# Patient Record
Sex: MALE | Race: WHITE | Employment: OTHER | URBAN - METROPOLITAN AREA
[De-identification: names, ages, dates, MRNs, and addresses within clinical notes are randomized per-mention and may not be internally consistent; named-entity substitution may affect disease eponyms.]

---

## 2019-07-02 ENCOUNTER — APPOINTMENT (OUTPATIENT)
Dept: GENERAL RADIOLOGY | Age: 84
DRG: 482 | End: 2019-07-02
Payer: MEDICARE

## 2019-07-02 ENCOUNTER — HOSPITAL ENCOUNTER (INPATIENT)
Age: 84
LOS: 3 days | Discharge: NURSING, REHAB OR LONG-TERM CARE | DRG: 482 | End: 2019-07-05
Attending: EMERGENCY MEDICINE | Admitting: INTERNAL MEDICINE
Payer: MEDICARE

## 2019-07-02 ENCOUNTER — APPOINTMENT (OUTPATIENT)
Dept: CT IMAGING | Age: 84
DRG: 482 | End: 2019-07-02
Payer: MEDICARE

## 2019-07-02 DIAGNOSIS — Z98.890 HISTORY OF SURGERY: ICD-10-CM

## 2019-07-02 DIAGNOSIS — S72.001A CLOSED FRACTURE OF RIGHT HIP, INITIAL ENCOUNTER (HCC): Primary | ICD-10-CM

## 2019-07-02 LAB
ALBUMIN SERPL-MCNC: 3.4 G/DL (ref 3.5–5.2)
ALP BLD-CCNC: 140 U/L (ref 40–129)
ALT SERPL-CCNC: 12 U/L (ref 0–40)
ANION GAP SERPL CALCULATED.3IONS-SCNC: 10 MMOL/L (ref 7–16)
AST SERPL-CCNC: 22 U/L (ref 0–39)
BILIRUB SERPL-MCNC: 0.5 MG/DL (ref 0–1.2)
BUN BLDV-MCNC: 22 MG/DL (ref 8–23)
CALCIUM SERPL-MCNC: 8.6 MG/DL (ref 8.6–10.2)
CHLORIDE BLD-SCNC: 105 MMOL/L (ref 98–107)
CO2: 26 MMOL/L (ref 22–29)
CREAT SERPL-MCNC: 1.7 MG/DL (ref 0.7–1.2)
GFR AFRICAN AMERICAN: 46
GFR NON-AFRICAN AMERICAN: 38 ML/MIN/1.73
GLUCOSE BLD-MCNC: 154 MG/DL (ref 74–99)
HCT VFR BLD CALC: 41.3 % (ref 37–54)
HEMOGLOBIN: 14.1 G/DL (ref 12.5–16.5)
INR BLD: 1.1
MCH RBC QN AUTO: 31.5 PG (ref 26–35)
MCHC RBC AUTO-ENTMCNC: 34.1 % (ref 32–34.5)
MCV RBC AUTO: 92.2 FL (ref 80–99.9)
PDW BLD-RTO: 15.7 FL (ref 11.5–15)
PLATELET # BLD: 311 E9/L (ref 130–450)
PMV BLD AUTO: 10.9 FL (ref 7–12)
POTASSIUM SERPL-SCNC: 4.3 MMOL/L (ref 3.5–5)
PROTHROMBIN TIME: 12.7 SEC (ref 9.3–12.4)
RBC # BLD: 4.48 E12/L (ref 3.8–5.8)
SODIUM BLD-SCNC: 141 MMOL/L (ref 132–146)
TOTAL PROTEIN: 7 G/DL (ref 6.4–8.3)
WBC # BLD: 6.2 E9/L (ref 4.5–11.5)

## 2019-07-02 PROCEDURE — 85610 PROTHROMBIN TIME: CPT

## 2019-07-02 PROCEDURE — 80053 COMPREHEN METABOLIC PANEL: CPT

## 2019-07-02 PROCEDURE — 70450 CT HEAD/BRAIN W/O DYE: CPT

## 2019-07-02 PROCEDURE — 73080 X-RAY EXAM OF ELBOW: CPT

## 2019-07-02 PROCEDURE — 99284 EMERGENCY DEPT VISIT MOD MDM: CPT

## 2019-07-02 PROCEDURE — 85027 COMPLETE CBC AUTOMATED: CPT

## 2019-07-02 PROCEDURE — 73552 X-RAY EXAM OF FEMUR 2/>: CPT

## 2019-07-02 PROCEDURE — 72125 CT NECK SPINE W/O DYE: CPT

## 2019-07-02 PROCEDURE — 73502 X-RAY EXAM HIP UNI 2-3 VIEWS: CPT

## 2019-07-02 PROCEDURE — 36415 COLL VENOUS BLD VENIPUNCTURE: CPT

## 2019-07-02 PROCEDURE — 71045 X-RAY EXAM CHEST 1 VIEW: CPT

## 2019-07-02 PROCEDURE — 1200000000 HC SEMI PRIVATE

## 2019-07-02 PROCEDURE — 93005 ELECTROCARDIOGRAM TRACING: CPT | Performed by: EMERGENCY MEDICINE

## 2019-07-02 PROCEDURE — 99222 1ST HOSP IP/OBS MODERATE 55: CPT | Performed by: ORTHOPAEDIC SURGERY

## 2019-07-02 RX ORDER — SODIUM CHLORIDE 0.9 % (FLUSH) 0.9 %
10 SYRINGE (ML) INJECTION PRN
Status: DISCONTINUED | OUTPATIENT
Start: 2019-07-02 | End: 2019-07-05 | Stop reason: HOSPADM

## 2019-07-02 RX ORDER — SODIUM CHLORIDE 0.9 % (FLUSH) 0.9 %
10 SYRINGE (ML) INJECTION EVERY 12 HOURS SCHEDULED
Status: DISCONTINUED | OUTPATIENT
Start: 2019-07-03 | End: 2019-07-02 | Stop reason: SDUPTHER

## 2019-07-02 RX ORDER — ONDANSETRON 2 MG/ML
4 INJECTION INTRAMUSCULAR; INTRAVENOUS EVERY 6 HOURS PRN
Status: DISCONTINUED | OUTPATIENT
Start: 2019-07-02 | End: 2019-07-02

## 2019-07-02 RX ORDER — SODIUM CHLORIDE 0.9 % (FLUSH) 0.9 %
10 SYRINGE (ML) INJECTION PRN
Status: DISCONTINUED | OUTPATIENT
Start: 2019-07-02 | End: 2019-07-02

## 2019-07-02 RX ORDER — ONDANSETRON 2 MG/ML
4 INJECTION INTRAMUSCULAR; INTRAVENOUS EVERY 6 HOURS PRN
Status: DISCONTINUED | OUTPATIENT
Start: 2019-07-02 | End: 2019-07-05 | Stop reason: HOSPADM

## 2019-07-02 RX ORDER — HYDROCODONE BITARTRATE AND ACETAMINOPHEN 5; 325 MG/1; MG/1
1 TABLET ORAL EVERY 6 HOURS PRN
Status: DISCONTINUED | OUTPATIENT
Start: 2019-07-02 | End: 2019-07-05 | Stop reason: HOSPADM

## 2019-07-02 RX ORDER — SODIUM CHLORIDE 0.9 % (FLUSH) 0.9 %
10 SYRINGE (ML) INJECTION EVERY 12 HOURS SCHEDULED
Status: DISCONTINUED | OUTPATIENT
Start: 2019-07-03 | End: 2019-07-05 | Stop reason: HOSPADM

## 2019-07-02 SDOH — HEALTH STABILITY: MENTAL HEALTH: HOW OFTEN DO YOU HAVE A DRINK CONTAINING ALCOHOL?: NEVER

## 2019-07-02 ASSESSMENT — PAIN DESCRIPTION - LOCATION
LOCATION: LEG
LOCATION: LEG

## 2019-07-02 ASSESSMENT — PAIN DESCRIPTION - PAIN TYPE
TYPE: ACUTE PAIN
TYPE: ACUTE PAIN

## 2019-07-02 ASSESSMENT — PAIN DESCRIPTION - DESCRIPTORS
DESCRIPTORS: DISCOMFORT
DESCRIPTORS: ACHING;DISCOMFORT;NAGGING

## 2019-07-02 ASSESSMENT — PAIN SCALES - GENERAL
PAINLEVEL_OUTOF10: 4
PAINLEVEL_OUTOF10: 3

## 2019-07-03 ENCOUNTER — ANESTHESIA (OUTPATIENT)
Dept: OPERATING ROOM | Age: 84
DRG: 482 | End: 2019-07-03
Payer: MEDICARE

## 2019-07-03 ENCOUNTER — ANESTHESIA EVENT (OUTPATIENT)
Dept: OPERATING ROOM | Age: 84
DRG: 482 | End: 2019-07-03
Payer: MEDICARE

## 2019-07-03 ENCOUNTER — APPOINTMENT (OUTPATIENT)
Dept: GENERAL RADIOLOGY | Age: 84
DRG: 482 | End: 2019-07-03
Payer: MEDICARE

## 2019-07-03 VITALS — OXYGEN SATURATION: 93 % | SYSTOLIC BLOOD PRESSURE: 121 MMHG | DIASTOLIC BLOOD PRESSURE: 69 MMHG

## 2019-07-03 LAB
ABO/RH: NORMAL
ANION GAP SERPL CALCULATED.3IONS-SCNC: 13 MMOL/L (ref 7–16)
ANTIBODY SCREEN: NORMAL
APTT: 31 SEC (ref 24.5–35.1)
BUN BLDV-MCNC: 21 MG/DL (ref 8–23)
CALCIUM SERPL-MCNC: 9 MG/DL (ref 8.6–10.2)
CHLORIDE BLD-SCNC: 103 MMOL/L (ref 98–107)
CO2: 24 MMOL/L (ref 22–29)
CREAT SERPL-MCNC: 1.5 MG/DL (ref 0.7–1.2)
EKG ATRIAL RATE: 69 BPM
EKG P AXIS: 73 DEGREES
EKG P-R INTERVAL: 206 MS
EKG Q-T INTERVAL: 366 MS
EKG QRS DURATION: 94 MS
EKG QTC CALCULATION (BAZETT): 392 MS
EKG R AXIS: 80 DEGREES
EKG T AXIS: 119 DEGREES
EKG VENTRICULAR RATE: 69 BPM
GFR AFRICAN AMERICAN: 53
GFR NON-AFRICAN AMERICAN: 44 ML/MIN/1.73
GLUCOSE BLD-MCNC: 120 MG/DL (ref 74–99)
HCT VFR BLD CALC: 43.2 % (ref 37–54)
HEMOGLOBIN: 14.1 G/DL (ref 12.5–16.5)
MCH RBC QN AUTO: 30.2 PG (ref 26–35)
MCHC RBC AUTO-ENTMCNC: 32.6 % (ref 32–34.5)
MCV RBC AUTO: 92.5 FL (ref 80–99.9)
PDW BLD-RTO: 15.8 FL (ref 11.5–15)
PLATELET # BLD: 305 E9/L (ref 130–450)
PMV BLD AUTO: 10.7 FL (ref 7–12)
POTASSIUM REFLEX MAGNESIUM: 4 MMOL/L (ref 3.5–5)
RBC # BLD: 4.67 E12/L (ref 3.8–5.8)
SODIUM BLD-SCNC: 140 MMOL/L (ref 132–146)
WBC # BLD: 7.8 E9/L (ref 4.5–11.5)

## 2019-07-03 PROCEDURE — C1713 ANCHOR/SCREW BN/BN,TIS/BN: HCPCS | Performed by: ORTHOPAEDIC SURGERY

## 2019-07-03 PROCEDURE — 3600000005 HC SURGERY LEVEL 5 BASE: Performed by: ORTHOPAEDIC SURGERY

## 2019-07-03 PROCEDURE — 85730 THROMBOPLASTIN TIME PARTIAL: CPT

## 2019-07-03 PROCEDURE — 86900 BLOOD TYPING SEROLOGIC ABO: CPT

## 2019-07-03 PROCEDURE — 72170 X-RAY EXAM OF PELVIS: CPT

## 2019-07-03 PROCEDURE — 2580000003 HC RX 258: Performed by: STUDENT IN AN ORGANIZED HEALTH CARE EDUCATION/TRAINING PROGRAM

## 2019-07-03 PROCEDURE — 36415 COLL VENOUS BLD VENIPUNCTURE: CPT

## 2019-07-03 PROCEDURE — 73502 X-RAY EXAM HIP UNI 2-3 VIEWS: CPT

## 2019-07-03 PROCEDURE — 1200000000 HC SEMI PRIVATE

## 2019-07-03 PROCEDURE — 2580000003 HC RX 258: Performed by: INTERNAL MEDICINE

## 2019-07-03 PROCEDURE — 93010 ELECTROCARDIOGRAM REPORT: CPT | Performed by: INTERNAL MEDICINE

## 2019-07-03 PROCEDURE — 27269 OPTX THIGH FX: CPT | Performed by: ORTHOPAEDIC SURGERY

## 2019-07-03 PROCEDURE — 3600000015 HC SURGERY LEVEL 5 ADDTL 15MIN: Performed by: ORTHOPAEDIC SURGERY

## 2019-07-03 PROCEDURE — 2500000003 HC RX 250 WO HCPCS: Performed by: STUDENT IN AN ORGANIZED HEALTH CARE EDUCATION/TRAINING PROGRAM

## 2019-07-03 PROCEDURE — 80048 BASIC METABOLIC PNL TOTAL CA: CPT

## 2019-07-03 PROCEDURE — 73562 X-RAY EXAM OF KNEE 3: CPT

## 2019-07-03 PROCEDURE — 6370000000 HC RX 637 (ALT 250 FOR IP): Performed by: INTERNAL MEDICINE

## 2019-07-03 PROCEDURE — 7100000000 HC PACU RECOVERY - FIRST 15 MIN: Performed by: ORTHOPAEDIC SURGERY

## 2019-07-03 PROCEDURE — 3700000000 HC ANESTHESIA ATTENDED CARE: Performed by: ORTHOPAEDIC SURGERY

## 2019-07-03 PROCEDURE — 85027 COMPLETE CBC AUTOMATED: CPT

## 2019-07-03 PROCEDURE — 2580000003 HC RX 258

## 2019-07-03 PROCEDURE — 86901 BLOOD TYPING SEROLOGIC RH(D): CPT

## 2019-07-03 PROCEDURE — 6360000002 HC RX W HCPCS

## 2019-07-03 PROCEDURE — 86850 RBC ANTIBODY SCREEN: CPT

## 2019-07-03 PROCEDURE — 2709999900 HC NON-CHARGEABLE SUPPLY: Performed by: ORTHOPAEDIC SURGERY

## 2019-07-03 PROCEDURE — 3209999900 FLUORO FOR SURGICAL PROCEDURES

## 2019-07-03 PROCEDURE — 6360000002 HC RX W HCPCS: Performed by: STUDENT IN AN ORGANIZED HEALTH CARE EDUCATION/TRAINING PROGRAM

## 2019-07-03 PROCEDURE — 2720000010 HC SURG SUPPLY STERILE: Performed by: ORTHOPAEDIC SURGERY

## 2019-07-03 PROCEDURE — 3700000001 HC ADD 15 MINUTES (ANESTHESIA): Performed by: ORTHOPAEDIC SURGERY

## 2019-07-03 PROCEDURE — 2500000003 HC RX 250 WO HCPCS

## 2019-07-03 PROCEDURE — 7100000001 HC PACU RECOVERY - ADDTL 15 MIN: Performed by: ORTHOPAEDIC SURGERY

## 2019-07-03 PROCEDURE — C1769 GUIDE WIRE: HCPCS | Performed by: ORTHOPAEDIC SURGERY

## 2019-07-03 PROCEDURE — 6370000000 HC RX 637 (ALT 250 FOR IP): Performed by: STUDENT IN AN ORGANIZED HEALTH CARE EDUCATION/TRAINING PROGRAM

## 2019-07-03 DEVICE — BOLT BONE L90MM DIA10MM GLD TI ALLOY FOR FEM NK SYS: Type: IMPLANTABLE DEVICE | Site: FEMUR | Status: FUNCTIONAL

## 2019-07-03 DEVICE — PLATE BONE 2 H 130DEG CCD ANG GLD TI ALLOY FOR FEM NK SYS: Type: IMPLANTABLE DEVICE | Site: FEMUR | Status: FUNCTIONAL

## 2019-07-03 DEVICE — SCREW BONE L40MM DIA5MM ST LCK W/ T25 STARDRV: Type: IMPLANTABLE DEVICE | Site: FEMUR | Status: FUNCTIONAL

## 2019-07-03 DEVICE — SCREW BONE L90MM DIA6.4MM BLU TI ALLOY ANTIROTATION T25: Type: IMPLANTABLE DEVICE | Site: FEMUR | Status: FUNCTIONAL

## 2019-07-03 RX ORDER — ASPIRIN 81 MG/1
81 TABLET ORAL 2 TIMES DAILY
Status: DISCONTINUED | OUTPATIENT
Start: 2019-07-03 | End: 2019-07-05 | Stop reason: HOSPADM

## 2019-07-03 RX ORDER — PHENYLEPHRINE HYDROCHLORIDE 10 MG/ML
INJECTION INTRAVENOUS PRN
Status: DISCONTINUED | OUTPATIENT
Start: 2019-07-03 | End: 2019-07-03 | Stop reason: SDUPTHER

## 2019-07-03 RX ORDER — PROPOFOL 10 MG/ML
INJECTION, EMULSION INTRAVENOUS PRN
Status: DISCONTINUED | OUTPATIENT
Start: 2019-07-03 | End: 2019-07-03 | Stop reason: SDUPTHER

## 2019-07-03 RX ORDER — ASPIRIN 81 MG/1
81 TABLET ORAL 2 TIMES DAILY
Qty: 56 TABLET | Refills: 0 | Status: ON HOLD | OUTPATIENT
Start: 2019-07-03 | End: 2019-07-27 | Stop reason: HOSPADM

## 2019-07-03 RX ORDER — SODIUM CHLORIDE 9 MG/ML
INJECTION, SOLUTION INTRAVENOUS CONTINUOUS
Status: DISCONTINUED | OUTPATIENT
Start: 2019-07-03 | End: 2019-07-04

## 2019-07-03 RX ORDER — EPHEDRINE SULFATE/0.9% NACL/PF 50 MG/5 ML
SYRINGE (ML) INTRAVENOUS PRN
Status: DISCONTINUED | OUTPATIENT
Start: 2019-07-03 | End: 2019-07-03 | Stop reason: SDUPTHER

## 2019-07-03 RX ORDER — SODIUM CHLORIDE 9 MG/ML
INJECTION, SOLUTION INTRAVENOUS CONTINUOUS PRN
Status: DISCONTINUED | OUTPATIENT
Start: 2019-07-03 | End: 2019-07-03 | Stop reason: SDUPTHER

## 2019-07-03 RX ORDER — BUPIVACAINE HYDROCHLORIDE 7.5 MG/ML
INJECTION, SOLUTION INTRASPINAL PRN
Status: DISCONTINUED | OUTPATIENT
Start: 2019-07-03 | End: 2019-07-03 | Stop reason: SDUPTHER

## 2019-07-03 RX ORDER — FENTANYL CITRATE 50 UG/ML
INJECTION, SOLUTION INTRAMUSCULAR; INTRAVENOUS PRN
Status: DISCONTINUED | OUTPATIENT
Start: 2019-07-03 | End: 2019-07-03 | Stop reason: SDUPTHER

## 2019-07-03 RX ORDER — HYDROCODONE BITARTRATE AND ACETAMINOPHEN 5; 325 MG/1; MG/1
1 TABLET ORAL EVERY 6 HOURS PRN
Qty: 28 TABLET | Refills: 0 | Status: SHIPPED | OUTPATIENT
Start: 2019-07-03 | End: 2019-07-10

## 2019-07-03 RX ADMIN — PHENYLEPHRINE HYDROCHLORIDE 100 MCG: 10 INJECTION INTRAVENOUS at 09:05

## 2019-07-03 RX ADMIN — PHENYLEPHRINE HYDROCHLORIDE 100 MCG: 10 INJECTION INTRAVENOUS at 08:45

## 2019-07-03 RX ADMIN — TRANEXAMIC ACID 1000 MG: 1 INJECTION, SOLUTION INTRAVENOUS at 09:15

## 2019-07-03 RX ADMIN — Medication 2 G: at 08:38

## 2019-07-03 RX ADMIN — PHENYLEPHRINE HYDROCHLORIDE 100 MCG: 10 INJECTION INTRAVENOUS at 08:55

## 2019-07-03 RX ADMIN — HYDROCODONE BITARTRATE AND ACETAMINOPHEN 1 TABLET: 5; 325 TABLET ORAL at 00:18

## 2019-07-03 RX ADMIN — Medication 5 MG: at 09:05

## 2019-07-03 RX ADMIN — HYDROCODONE BITARTRATE AND ACETAMINOPHEN 1 TABLET: 5; 325 TABLET ORAL at 23:53

## 2019-07-03 RX ADMIN — BUPIVACAINE HYDROCHLORIDE IN DEXTROSE 1.5 ML: 7.5 INJECTION, SOLUTION SUBARACHNOID at 08:37

## 2019-07-03 RX ADMIN — PROPOFOL 30 MG: 10 INJECTION, EMULSION INTRAVENOUS at 08:28

## 2019-07-03 RX ADMIN — HYDROCODONE BITARTRATE AND ACETAMINOPHEN 1 TABLET: 5; 325 TABLET ORAL at 17:47

## 2019-07-03 RX ADMIN — PHENYLEPHRINE HYDROCHLORIDE 100 MCG: 10 INJECTION INTRAVENOUS at 09:00

## 2019-07-03 RX ADMIN — SODIUM CHLORIDE: 9 INJECTION, SOLUTION INTRAVENOUS at 17:47

## 2019-07-03 RX ADMIN — Medication 5 MG: at 09:18

## 2019-07-03 RX ADMIN — PHENYLEPHRINE HYDROCHLORIDE 100 MCG: 10 INJECTION INTRAVENOUS at 08:37

## 2019-07-03 RX ADMIN — PROPOFOL 20 MG: 10 INJECTION, EMULSION INTRAVENOUS at 08:36

## 2019-07-03 RX ADMIN — TRANEXAMIC ACID 1000 MG: 1 INJECTION, SOLUTION INTRAVENOUS at 12:53

## 2019-07-03 RX ADMIN — FENTANYL CITRATE 20 MCG: 50 INJECTION, SOLUTION INTRAMUSCULAR; INTRAVENOUS at 08:37

## 2019-07-03 RX ADMIN — SODIUM CHLORIDE: 9 INJECTION, SOLUTION INTRAVENOUS at 00:50

## 2019-07-03 RX ADMIN — HYDROCODONE BITARTRATE AND ACETAMINOPHEN 1 TABLET: 5; 325 TABLET ORAL at 06:39

## 2019-07-03 RX ADMIN — SODIUM CHLORIDE: 9 INJECTION, SOLUTION INTRAVENOUS at 08:25

## 2019-07-03 RX ADMIN — FENTANYL CITRATE 30 MCG: 50 INJECTION, SOLUTION INTRAMUSCULAR; INTRAVENOUS at 08:28

## 2019-07-03 RX ADMIN — SODIUM CHLORIDE: 9 INJECTION, SOLUTION INTRAVENOUS at 12:23

## 2019-07-03 ASSESSMENT — PAIN SCALES - GENERAL
PAINLEVEL_OUTOF10: 0
PAINLEVEL_OUTOF10: 5
PAINLEVEL_OUTOF10: 0
PAINLEVEL_OUTOF10: 9
PAINLEVEL_OUTOF10: 0
PAINLEVEL_OUTOF10: 5
PAINLEVEL_OUTOF10: 10
PAINLEVEL_OUTOF10: 5
PAINLEVEL_OUTOF10: 5

## 2019-07-03 ASSESSMENT — PULMONARY FUNCTION TESTS
PIF_VALUE: 0
PIF_VALUE: 1
PIF_VALUE: 0

## 2019-07-03 ASSESSMENT — PAIN DESCRIPTION - LOCATION
LOCATION: LEG
LOCATION: LEG

## 2019-07-03 ASSESSMENT — PAIN DESCRIPTION - PAIN TYPE
TYPE: SURGICAL PAIN
TYPE: SURGICAL PAIN

## 2019-07-03 ASSESSMENT — PAIN DESCRIPTION - DESCRIPTORS
DESCRIPTORS: ACHING;DISCOMFORT
DESCRIPTORS: ACHING;DISCOMFORT

## 2019-07-03 NOTE — ANESTHESIA PROCEDURE NOTES
Spinal Block    Patient location during procedure: OR  End time: 7/3/2019 8:38 AM  Reason for block: primary anesthetic  Staffing  Anesthesiologist: Winnie Lott MD  Performed: anesthesiologist   Preanesthetic Checklist  Completed: patient identified, site marked, surgical consent, pre-op evaluation, timeout performed, IV checked, risks and benefits discussed, monitors and equipment checked, anesthesia consent given, oxygen available and patient being monitored  Spinal Block  Patient position: right lateral decubitus  Prep: ChloraPrep  Patient monitoring: cardiac monitor, continuous pulse ox, continuous capnometry and frequent blood pressure checks  Approach: right paramedian  Location: L3/L4  Provider prep: mask and sterile gloves  Local infiltration: lidocaine  Dose: 0.2  Agent: bupivacaine  Adjuvant: fentanyl  Dose: 1.5  Dose: 1.5  Needle  Needle type: Quincke   Assessment  Swirl obtained: Yes  CSF: clear  Attempts: 2  Hemodynamics: stable  Additional Notes  Well tolerated.  Time out performed

## 2019-07-03 NOTE — ED NOTES
Bed: 19  Expected date:   Expected time:   Means of arrival:   Comments:  Wilmer Snyder RN  07/02/19 2057

## 2019-07-03 NOTE — PROGRESS NOTES
Department of Orthopedic Surgery  Resident Progress Note    Patient seen and examined. Pain controlled. No new complaints. States he is having some right hip pain. No other complaints. VITALS:  BP (!) 153/73   Pulse 69   Temp 97.1 °F (36.2 °C) (Oral)   Resp 18   Ht 5' 8\" (1.727 m)   Wt 145 lb (65.8 kg)   SpO2 93%   BMI 22.05 kg/m²     General: alert and oriented to person, place and time, well-developed and well-nourished, in no acute distress    MUSCULOSKELETAL:   right lower extremity:  · Dressing C/D/I  · Compartments soft and compressible  · +PF/DF/EHL  · +2/4 DP & PT pulses, Brisk Cap refill, Toes warm and perfused  · Distal sensation grossly intact to Peroneals, Sural, Saphenous, and tibial nrs    CBC:   Lab Results   Component Value Date    WBC 6.2 07/02/2019    HGB 14.1 07/02/2019    HCT 41.3 07/02/2019     07/02/2019     PT/INR:    Lab Results   Component Value Date    PROTIME 12.7 07/02/2019    INR 1.1 07/02/2019       ASSESSMENT  · Right basicervial femoral neck fracture    PLAN      · Continue physical therapy and protocol: NWB - RLE  · 24 hour abx coverage  · Deep venous thrombosis prophylaxis - Hold anticoagulants  · PT/OT  · Pain Control: IV and PO  · Monitor H&H  · Plan for surgical fixation Today  · Discussed with Dr. Kathrine Roy/ Dr. Rosendo Sevilla    The procedure, indications, risks, limitations and recovery time were all reviewed with patient, who requests to proceed.

## 2019-07-03 NOTE — CARE COORDINATION
Spoke with daughter at bedside to discuss transition of care. Pt in surgery. Daughter stated he was living with her, independently. They recently moved here from [facility]. He was walking the dogs daily. She is concerned he may not be able to come home due to her traveling for work. Discussed NANCY and she is agreeable. Choiced for [facility]. Referral initiated. Awaiting return and post op therapy evals.  CM will follow up

## 2019-07-03 NOTE — H&P
nondisplaced intertrochanteric fracture of the proximal   right femur. The distal femur is unremarkable for acute findings. Degree of osteopenia is present. No trauma involving the knee is   identified. No focal soft tissue abnormalities are identified. IMPRESSION:   Nondisplaced intertrochanteric fracture of the right femur. RIGHT ELBOW:      NUMBER OF IMAGES:  4 views      INDICATION: Right hip pain following injury       COMPARISON: None      FINDINGS:    Bony structures are intact with preservation of alignment and joint   spacing. No focal soft tissue abnormalities are identified. Antecubital   superficial venous access is identified. IMPRESSION:   Negative for acute pathologic findings. XR FEMUR RIGHT (MIN 2 VIEWS)   Final Result   Nondisplaced intertrochanteric fracture of the proximal right femur      RIGHT HIP:      NUMBER OF IMAGES:  4 views      INDICATION: Right hip pain following injury       COMPARISON: None      FINDINGS:    There is a nondisplaced intertrochanteric fracture of the proximal   right femur. The distal femur is unremarkable for acute findings. Degree of osteopenia is present. No trauma involving the knee is   identified. No focal soft tissue abnormalities are identified. IMPRESSION:   Nondisplaced intertrochanteric fracture of the right femur. RIGHT ELBOW:      NUMBER OF IMAGES:  4 views      INDICATION: Right hip pain following injury       COMPARISON: None      FINDINGS:    Bony structures are intact with preservation of alignment and joint   spacing. No focal soft tissue abnormalities are identified. Antecubital   superficial venous access is identified. IMPRESSION:   Negative for acute pathologic findings.             XR ELBOW RIGHT (MIN 3 VIEWS)   Final Result   Nondisplaced intertrochanteric fracture of the proximal right femur      RIGHT HIP:      NUMBER OF IMAGES:  4 views      INDICATION: Right hip pain

## 2019-07-03 NOTE — ED PROVIDER NOTES
distress  Cardiovascular:  Regular rate. Regular rhythm. No murmurs, gallops, or rubs. 2+ distal pulses  Chest: No chest wall tenderness  Abdomen: Soft. Non tender. Non distended. +BS. No rebound, guarding, or rigidity. No pulsatile masses appreciated. Musculoskeletal: Contusion and abrasion noted to right elbow. Tenderness noted to distal right femur region and has limited ROM of the RLE secondary to pain. Warm and well perfused, no clubbing, cyanosis, or edema. Capillary refill <3 seconds  Skin: warm  Neurologic: GCS 15, CN II-XII grossly intact, no focal deficits,  Psych: Normal Affect    -------------------------------------------------- RESULTS -------------------------------------------------  I have personally reviewed all laboratory and imaging results for this patient. Results are listed below.      LABS:  Results for orders placed or performed during the hospital encounter of 07/02/19   CBC   Result Value Ref Range    WBC 6.2 4.5 - 11.5 E9/L    RBC 4.48 3.80 - 5.80 E12/L    Hemoglobin 14.1 12.5 - 16.5 g/dL    Hematocrit 41.3 37.0 - 54.0 %    MCV 92.2 80.0 - 99.9 fL    MCH 31.5 26.0 - 35.0 pg    MCHC 34.1 32.0 - 34.5 %    RDW 15.7 (H) 11.5 - 15.0 fL    Platelets 234 398 - 967 E9/L    MPV 10.9 7.0 - 12.0 fL   Comprehensive Metabolic Panel   Result Value Ref Range    Sodium 141 132 - 146 mmol/L    Potassium 4.3 3.5 - 5.0 mmol/L    Chloride 105 98 - 107 mmol/L    CO2 26 22 - 29 mmol/L    Anion Gap 10 7 - 16 mmol/L    Glucose 154 (H) 74 - 99 mg/dL    BUN 22 8 - 23 mg/dL    CREATININE 1.7 (H) 0.7 - 1.2 mg/dL    GFR Non-African American 38 >=60 mL/min/1.73    GFR African American 46     Calcium 8.6 8.6 - 10.2 mg/dL    Total Protein 7.0 6.4 - 8.3 g/dL    Alb 3.4 (L) 3.5 - 5.2 g/dL    Total Bilirubin 0.5 0.0 - 1.2 mg/dL    Alkaline Phosphatase 140 (H) 40 - 129 U/L    ALT 12 0 - 40 U/L    AST 22 0 - 39 U/L   Protime-INR   Result Value Ref Range    Protime 12.7 (H) 9.3 - 12.4 sec    INR 1.1    EKG 12 Lead   Result knee is   identified. No focal soft tissue abnormalities are identified. IMPRESSION:   Nondisplaced intertrochanteric fracture of the right femur. RIGHT ELBOW:      NUMBER OF IMAGES:  4 views      INDICATION: Right hip pain following injury       COMPARISON: None      FINDINGS:    Bony structures are intact with preservation of alignment and joint   spacing. No focal soft tissue abnormalities are identified. Antecubital   superficial venous access is identified. IMPRESSION:   Negative for acute pathologic findings. XR ELBOW RIGHT (MIN 3 VIEWS)   Final Result   Nondisplaced intertrochanteric fracture of the proximal right femur      RIGHT HIP:      NUMBER OF IMAGES:  4 views      INDICATION: Right hip pain following injury       COMPARISON: None      FINDINGS:    There is a nondisplaced intertrochanteric fracture of the proximal   right femur. The distal femur is unremarkable for acute findings. Degree of osteopenia is present. No trauma involving the knee is   identified. No focal soft tissue abnormalities are identified. IMPRESSION:   Nondisplaced intertrochanteric fracture of the right femur. RIGHT ELBOW:      NUMBER OF IMAGES:  4 views      INDICATION: Right hip pain following injury       COMPARISON: None      FINDINGS:    Bony structures are intact with preservation of alignment and joint   spacing. No focal soft tissue abnormalities are identified. Antecubital   superficial venous access is identified. IMPRESSION:   Negative for acute pathologic findings. EKG:  This EKG is signed and interpreted by me. Rate: 69  Rhythm: Sinus  Interpretation: non-specific EKG  Comparison: no previous EKG available      ------------------------- NURSING NOTES AND VITALS REVIEWED ---------------------------   The nursing notes within the ED encounter and vital signs as below have been reviewed by myself.   /72   Pulse 72   Temp 97.4 °F (36.3 °C)   Resp 18   Ht 5' 8\" (1.727 m)   Wt 145 lb (65.8 kg)   SpO2 96%   BMI 22.05 kg/m²   Oxygen Saturation Interpretation: Normal    The patients available past medical records and past encounters were reviewed. ------------------------------ ED COURSE/MEDICAL DECISION MAKING----------------------  Medications - No data to display    Medical Decision Making:    Patient assessed. Imaging and Labs ordered and will be reviewed; results to be discussed with patient. I did speak to family and family reports ED did strike his head and he was not aware of this. CT head was ordered as well as CT of his neck since he did hit his head and patient has distracting injury. This patient's ED course included: a personal history and physical examination and re-evaluation prior to disposition. This patient has been closely monitored during their ED course. Re-Evaluations:           Re-evaluation. Patient symptoms show no change. Pt made aware of findings. Consultations:             Internal medicine and orthopedics consulted  Counseling: The emergency provider has spoken with the patient and discussed todays results, in addition to providing specific details for the plan of care and counseling regarding the diagnosis and prognosis. Questions are answered at this time and they are agreeable with the plan.     --------------------------------- IMPRESSION AND DISPOSITION ---------------------------------    IMPRESSION  1. [facility] fracture of right [facility]        DISPOSITION  Disposition: Admit to med/surg floor  Patient condition is stable     7/2/19, 9:02 PM.    This note is prepared by [employer], acting as Scribe for Josefa Cantor MD.    Josefa Cantor MD: The scribe's documentation has been prepared under my direction and personally reviewed by me in its entirety.   I confirm that the note above accurately reflects all work, treatment, procedures, and medical decision

## 2019-07-03 NOTE — CONSULTS
agitation and anxiety    PHYSICAL EXAM:    VITALS:  BP (!) 153/73   Pulse 69   Temp 97.1 °F (36.2 °C) (Oral)   Resp 18   Ht 5' 8\" (1.727 m)   Wt 145 lb (65.8 kg)   SpO2 93%   BMI 22.05 kg/m²   CONSTITUTIONAL:  awake, alert, cooperative, no apparent distress, and appears stated age  MUSCULOSKELETAL:  Right lower Extremity:  · Positive tenderness to palpation about the hip  · Skin intact  · Compartments soft and compressible  · Positive logroll  · No pain at the knee tibia, ankle, or foot  · Positive PF/DF/EHL  · Sensation intact light touch DP/SP/T/S/S nerve distributions  · PT/DP pulses 2+    Secondary Exam:   · bilateralUE: Skin tear to left elbow. -TTP to fingers, hand, wrist, forearm, elbow, humerus, shoulder or clavicle. -- Patient able to flex/extend fingers, wrist, elbow and shoulder with active and passive ROM without pain, +2/4 Radial pulse, cap refill <3sec, +AIN/PIN/Radial/Ulnar/Median N, distal sensation grossly intact to C4-T1 dermatomes, compartments soft and compressible. · leftLE: No obvious signs of trauma. -TTP to foot, ankle, leg, knee, thigh, hip.-- Patient able to flex/extend toes, ankle, knee and hip with active and passive ROM without pain,+2/4 DP & PT pulses, cap refill <3sec, +5/5 PF/DF/EHL, distal sensation grossly intact to L4-S1 dermatomes, compartments soft and compressible. · Pelvis: -TTP, -Log roll, -Heel strike     DATA:    CBC:   Lab Results   Component Value Date    WBC 6.2 07/02/2019    RBC 4.48 07/02/2019    HGB 14.1 07/02/2019    HCT 41.3 07/02/2019    MCV 92.2 07/02/2019    MCH 31.5 07/02/2019    MCHC 34.1 07/02/2019    RDW 15.7 07/02/2019     07/02/2019    MPV 10.9 07/02/2019     PT/INR:    Lab Results   Component Value Date    PROTIME 12.7 07/02/2019    INR 1.1 07/02/2019       Radiology Review:  XR hip right:  Demonstrates a basicervical femoral neck fracture with minimal displacement on AP view, slight apex anterior lateral view.     IMPRESSION:  · Closed

## 2019-07-03 NOTE — ANESTHESIA PRE PROCEDURE
Department of Anesthesiology  Preprocedure Note       Name:  Virgil Ordoñez   Age:  80 y.o.  :  1928                                          MRN:  [id number]         Date:  7/3/2019      Surgeon: Sandy Cartwright):  Kianna Allan MD    Procedure: HIP OPEN REDUCTION INTERNAL FIXATION (Right )    Medications prior to admission:   Prior to Admission medications    Not on File       Current medications:    Current Facility-Administered Medications   Medication Dose Route Frequency Provider Last Rate Last Dose    0.9 % sodium chloride infusion   Intravenous Continuous Sis Mathews MD 75 mL/hr at 19 0050      sodium chloride flush 0.9 % injection 10 mL  10 mL Intravenous 2 times per day Sis Mathews MD        sodium chloride flush 0.9 % injection 10 mL  10 mL Intravenous PRN Sis Mathews MD        magnesium hydroxide (MILK OF MAGNESIA) 400 MG/5ML suspension 30 mL  30 mL Oral Daily PRN Sis Mathews MD        ondansetron (ZOFRAN) injection 4 mg  4 mg Intravenous Q6H PRN Sis Mathews MD        HYDROcodone-acetaminophen (NORCO) 5-325 MG per tablet 1 tablet  1 tablet Oral Q6H PRN Sis Mathews MD   1 tablet at 19 0018       Allergies:  No Known Allergies    Problem List:    Patient Active Problem List   Diagnosis Code    [facility] S72.001A       Past Medical History:  History reviewed. No pertinent past medical history. Past Surgical History:  History reviewed. No pertinent surgical history.     Social History:    Social History     Tobacco Use    Smoking status: Never Smoker    Smokeless tobacco: Never Used   Substance Use Topics    Alcohol use: Never     Frequency: Never                                Counseling given: Not Answered      Vital Signs (Current):   Vitals:    19 2100 19 2229 19 2301   BP: 129/72 121/65 (!) 153/73   Pulse: 72 67 69   Resp: 18 18 18   Temp: 36.3 °C (97.4 °F) 36.7 °C (98.1 °F) 36.2 °C (97.1 °F)   TempSrc: Oral Oral   SpO2: 96% 98% 93%   Weight: 145 lb (65.8 kg)     Height: 5' 8\" (1.727 m)                                                BP Readings from Last 3 Encounters:   07/02/19 (!) 153/73       NPO Status: Time of last liquid consumption: 2300                        Time of last solid consumption: 2300                        Date of last liquid consumption: 07/02/19                        Date of last solid food consumption: 07/02/19    BMI:   Wt Readings from Last 3 Encounters:   07/02/19 145 lb (65.8 kg)     Body mass index is 22.05 kg/m². CBC:   Lab Results   Component Value Date    WBC 6.2 07/02/2019    RBC 4.48 07/02/2019    HGB 14.1 07/02/2019    HCT 41.3 07/02/2019    MCV 92.2 07/02/2019    RDW 15.7 07/02/2019     07/02/2019       CMP:   Lab Results   Component Value Date     07/02/2019    K 4.3 07/02/2019     07/02/2019    CO2 26 07/02/2019    BUN 22 07/02/2019    CREATININE 1.7 07/02/2019    GFRAA 46 07/02/2019    LABGLOM 38 07/02/2019    GLUCOSE 154 07/02/2019    PROT 7.0 07/02/2019    CALCIUM 8.6 07/02/2019    BILITOT 0.5 07/02/2019    ALKPHOS 140 07/02/2019    AST 22 07/02/2019    ALT 12 07/02/2019       POC Tests: No results for input(s): POCGLU, POCNA, POCK, POCCL, POCBUN, POCHEMO, POCHCT in the last 72 hours. Coags:   Lab Results   Component Value Date    PROTIME 12.7 07/02/2019    INR 1.1 07/02/2019    APTT 31.0 07/03/2019       HCG (If Applicable): No results found for: PREGTESTUR, PREGSERUM, HCG, HCGQUANT     ABGs: No results found for: PHART, PO2ART, TFP3LAD, EIV7SRN, BEART, T8PPKHJE     Type & Screen (If Applicable):  No results found for: LABABO, LABRH     EKG 7/2/2019  Narrative   Normal sinus rhythm- rate 69  Nonspecific ST and T wave abnormality  Abnormal ECG  No previous ECGs available     XR Right hip 7/2/2019  FINDINGS:    Bony structures are intact with preservation of alignment and joint   spacing.       No focal soft tissue abnormalities are identified. Antecubital   superficial venous access is identified.       IMPRESSION:   Negative for acute pathologic findings.             Anesthesia Evaluation  Patient summary reviewed and Nursing notes reviewed no history of anesthetic complications:   Airway: Mallampati: II  TM distance: >3 FB   Neck ROM: full  Mouth opening: > = 3 FB Dental:      Comment: Patient denies any chipped, loose or cracked teeth    Pulmonary:Negative Pulmonary ROS and normal exam                               Cardiovascular:    (+) CAD (s/p PCI):, CABG/stent (per patient had stent in the past):,       ECG reviewed  Rhythm: regular  Rate: normal           Beta Blocker:  Not on Beta Blocker         Neuro/Psych:   Negative Neuro/Psych ROS              GI/Hepatic/Renal:            ROS comment: Hx bladder Ca. Endo/Other:                      ROS comment: Closed right hip fracture- right  Patient currently DNR CCA Abdominal:           Vascular: negative vascular ROS. Anesthesia Plan      general     ASA 3     (20 R AC- NS at 75cc/hr)  Induction: intravenous. BIS  MIPS: Prophylactic antiemetics administered and Postoperative trial extubation. Anesthetic plan and risks discussed with patient. Use of blood products discussed with patient whom consented to blood products. Plan discussed with CRNA and attending. Denise Aleman RN   7/3/2019      DOS STAFF ADDENDUM:    Patient seen and chart reviewed. Physical exam and history updated as indicated. NPO status confirmed. Anesthesia options and plan discussed including risks benefits with patient/legal guardian and family as available. Concerns and questions addressed. Consent verbalized to proceed. Anesthesia plan, options and intraoperative/postoperative concerns discussed with care team.    Discussed spinal with patient who agrees to both spinal and GA. Patient denies being on blood thinners for 'years'. Advanced directives noted.   Will

## 2019-07-04 LAB
ANION GAP SERPL CALCULATED.3IONS-SCNC: 12 MMOL/L (ref 7–16)
BASOPHILS ABSOLUTE: 0.02 E9/L (ref 0–0.2)
BASOPHILS RELATIVE PERCENT: 0.2 % (ref 0–2)
BUN BLDV-MCNC: 21 MG/DL (ref 8–23)
CALCIUM SERPL-MCNC: 8.3 MG/DL (ref 8.6–10.2)
CHLORIDE BLD-SCNC: 101 MMOL/L (ref 98–107)
CO2: 23 MMOL/L (ref 22–29)
CREAT SERPL-MCNC: 1.4 MG/DL (ref 0.7–1.2)
EOSINOPHILS ABSOLUTE: 0.04 E9/L (ref 0.05–0.5)
EOSINOPHILS RELATIVE PERCENT: 0.5 % (ref 0–6)
GFR AFRICAN AMERICAN: 57
GFR NON-AFRICAN AMERICAN: 47 ML/MIN/1.73
GLUCOSE BLD-MCNC: 128 MG/DL (ref 74–99)
HCT VFR BLD CALC: 39.6 % (ref 37–54)
HEMOGLOBIN: 12.8 G/DL (ref 12.5–16.5)
IMMATURE GRANULOCYTES #: 0.04 E9/L
IMMATURE GRANULOCYTES %: 0.5 % (ref 0–5)
LYMPHOCYTES ABSOLUTE: 1.17 E9/L (ref 1.5–4)
LYMPHOCYTES RELATIVE PERCENT: 13.7 % (ref 20–42)
MCH RBC QN AUTO: 30.1 PG (ref 26–35)
MCHC RBC AUTO-ENTMCNC: 32.3 % (ref 32–34.5)
MCV RBC AUTO: 93.2 FL (ref 80–99.9)
MONOCYTES ABSOLUTE: 1.34 E9/L (ref 0.1–0.95)
MONOCYTES RELATIVE PERCENT: 15.7 % (ref 2–12)
NEUTROPHILS ABSOLUTE: 5.9 E9/L (ref 1.8–7.3)
NEUTROPHILS RELATIVE PERCENT: 69.4 % (ref 43–80)
PDW BLD-RTO: 15.9 FL (ref 11.5–15)
PLATELET # BLD: 263 E9/L (ref 130–450)
PMV BLD AUTO: 11.2 FL (ref 7–12)
POTASSIUM REFLEX MAGNESIUM: 4 MMOL/L (ref 3.5–5)
RBC # BLD: 4.25 E12/L (ref 3.8–5.8)
SODIUM BLD-SCNC: 136 MMOL/L (ref 132–146)
WBC # BLD: 8.5 E9/L (ref 4.5–11.5)

## 2019-07-04 PROCEDURE — 97162 PT EVAL MOD COMPLEX 30 MIN: CPT | Performed by: PHYSICAL THERAPIST

## 2019-07-04 PROCEDURE — 97530 THERAPEUTIC ACTIVITIES: CPT

## 2019-07-04 PROCEDURE — 97166 OT EVAL MOD COMPLEX 45 MIN: CPT

## 2019-07-04 PROCEDURE — 80048 BASIC METABOLIC PNL TOTAL CA: CPT

## 2019-07-04 PROCEDURE — 99024 POSTOP FOLLOW-UP VISIT: CPT | Performed by: ORTHOPAEDIC SURGERY

## 2019-07-04 PROCEDURE — 97530 THERAPEUTIC ACTIVITIES: CPT | Performed by: PHYSICAL THERAPIST

## 2019-07-04 PROCEDURE — 6370000000 HC RX 637 (ALT 250 FOR IP): Performed by: STUDENT IN AN ORGANIZED HEALTH CARE EDUCATION/TRAINING PROGRAM

## 2019-07-04 PROCEDURE — 2580000003 HC RX 258: Performed by: STUDENT IN AN ORGANIZED HEALTH CARE EDUCATION/TRAINING PROGRAM

## 2019-07-04 PROCEDURE — 0QS604Z REPOSITION RIGHT UPPER FEMUR WITH INTERNAL FIXATION DEVICE, OPEN APPROACH: ICD-10-PCS | Performed by: ORTHOPAEDIC SURGERY

## 2019-07-04 PROCEDURE — 85025 COMPLETE CBC W/AUTO DIFF WBC: CPT

## 2019-07-04 PROCEDURE — 1200000000 HC SEMI PRIVATE

## 2019-07-04 PROCEDURE — 36415 COLL VENOUS BLD VENIPUNCTURE: CPT

## 2019-07-04 RX ADMIN — ASPIRIN 81 MG: 81 TABLET ORAL at 09:12

## 2019-07-04 RX ADMIN — SODIUM CHLORIDE: 9 INJECTION, SOLUTION INTRAVENOUS at 06:41

## 2019-07-04 RX ADMIN — HYDROCODONE BITARTRATE AND ACETAMINOPHEN 1 TABLET: 5; 325 TABLET ORAL at 06:01

## 2019-07-04 RX ADMIN — ASPIRIN 81 MG: 81 TABLET ORAL at 20:29

## 2019-07-04 RX ADMIN — Medication 10 ML: at 20:30

## 2019-07-04 ASSESSMENT — PAIN SCALES - GENERAL
PAINLEVEL_OUTOF10: 0
PAINLEVEL_OUTOF10: 8
PAINLEVEL_OUTOF10: 0

## 2019-07-04 ASSESSMENT — PAIN DESCRIPTION - LOCATION: LOCATION: LEG

## 2019-07-04 ASSESSMENT — PAIN DESCRIPTION - DESCRIPTORS: DESCRIPTORS: ACHING;DISCOMFORT

## 2019-07-04 ASSESSMENT — PAIN DESCRIPTION - PAIN TYPE: TYPE: SURGICAL PAIN

## 2019-07-04 NOTE — PLAN OF CARE
Problem: Falls - Risk of:  Goal: Will remain free from falls  Description  Will remain free from falls  Outcome: Met This Shift     Problem: Falls - Risk of:  Goal: Absence of physical injury  Description  Absence of physical injury  Outcome: Met This Shift     Problem: Risk for Impaired Skin Integrity  Goal: Tissue integrity - skin and mucous membranes  Description  Structural intactness and normal physiological function of skin and  mucous membranes.   Outcome: Met This Shift     Problem: Pain:  Goal: Pain level will decrease  Description  Pain level will decrease  Outcome: Met This Shift     Problem: Pain:  Goal: Control of acute pain  Description  Control of acute pain  Outcome: Met This Shift

## 2019-07-04 NOTE — PLAN OF CARE
Problem: Pain:  Goal: Control of acute pain  Description  Control of acute pain  Outcome: Met This Shift     Problem: Falls - Risk of:  Goal: Will remain free from falls  Description  Will remain free from falls  7/4/2019 0108 by Spencer Lea RN  Outcome: Met This Shift

## 2019-07-04 NOTE — PROGRESS NOTES
Hospitalist Progress Note      PCP: No primary care provider on file. Date of Admission: 7/2/2019  Days in the hospital: 1    Chief Complaint: Right hip pain    Hospital Course:     Seen by orthopedic surgery. Had ORIF of the right hip performed. PT to work with the patient. Did well in surgery. Subjective  Patient seen and examined at bedside. Patient states that he is not in any pain at this time. No complaints currently. Patient denies any fevers, chills, chest pain, shortness of breath, nausea, vomiting. Medications:  Reviewed    Infusion Medications    sodium chloride 75 mL/hr at 07/03/19 1747     Scheduled Medications    aspirin  81 mg Oral BID    sodium chloride flush  10 mL Intravenous 2 times per day     PRN Meds: sodium chloride flush, magnesium hydroxide, ondansetron, HYDROcodone 5 mg - acetaminophen      Intake/Output Summary (Last 24 hours) at 7/3/2019 2215  Last data filed at 7/3/2019 2138  Gross per 24 hour   Intake 2186 ml   Output 600 ml   Net 1586 ml       Exam:    BP (!) 147/71   Pulse 72   Temp 97.9 °F (36.6 °C) (Temporal)   Resp 18   Ht 5' 8\" (1.727 m)   Wt 145 lb (65.8 kg)   SpO2 94%   BMI 22.05 kg/m²     General appearance:  No apparent distress, appears stated age and cooperative. HEENT:  Normal cephalic, atraumatic without obvious deformity. Pupils equal, round, and reactive to light. Extra ocular muscles intact. Conjunctivae/corneas clear. Neck: Supple, with full range of motion. No jugular venous distention. Trachea midline. Respiratory:  Normal respiratory effort. Clear to auscultation, bilaterally without Rales/Wheezes/Rhonchi. Cardiovascular:  Regular rate and rhythm with normal S1/S2 without murmurs, rubs or gallops. Abdomen: Soft, non-tender, non-distended with normal bowel sounds. Musculoskeletal:  Contusion and abrasion noted to right elbow.  Tenderness noted to distal right femur region and has limited ROM of the RLE secondary to pain. Warm and Status  · Consulted     · Disposition  · Likely to go to [facility] [employer] - Chief Hospitalist  Please contact me through perfect serve    NOTE: This report was transcribed using voice recognition software. Every effort was made to ensure accuracy; however, inadvertent computerized transcription errors may be present.

## 2019-07-04 NOTE — PROGRESS NOTES
med  Clinical Decision Making- med    Evaluation time includes thorough review of current medical information, gathering information on past medical history/social history and prior level of function, completion of standardized testing/informal observation of tasks, assessment of data, and development of POC/Goals. Assessment of current deficits   Functional mobility [x]  ADLs [x] Strength [x]  Cognition [x]  Functional transfers  [x] IADLs [] Safety Awareness [x]  Endurance [x]  Fine Motor Coordination [] Balance [x] Vision/perception [] Sensation []   Gross Motor Coordination [] ROM [] Delirium []                  Motor Control []    Plan of Care:   ADL retraining [x]   Equipment needs [x]   Neuromuscular re-education [x] Energy Conservation Techniques [x]  Functional Transfer training [x] Patient and/or Family Education [x]  Functional Mobility training [x]  Environmental Modifications [x]  Cognitive re-training [x]   Compensatory techniques for ADLs [x]  Splinting Needs []   Positioning to improve overall function [x]   Therapeutic Activity [x]  Therapeutic Exercise  [x]  Visual/Perceptual: []    Delirium prevention/treatment  []   Other:  []    Rehab Potential: Good for established goals    Patient / Family Goal:  Not stated     Patient and/or family were instructed on diagnosis, prognosis/goals and plan of care. Demonstrated poor+ understanding. [] Malnutrition indicators have been identified and nursing has been notified to ensure a dietitian consult is ordered.        Mod Evaluation completed +  Timed Treatment: 10 minutes  Tx Time in: 12:42  TxTime out: 12:52        Stefania Olguin OTR/L #[id number]

## 2019-07-04 NOTE — PROGRESS NOTES
Prophylaxis  · SCDs    · Diet  DIET GENERAL;    · Code Status  DNR-CCA    · PT/OT Eval Status  · Consulted     · Disposition  · Likely to go to [facility] at [facility] [address] - Chief Hospitalist  Please contact me through perfect serve    NOTE: This report was transcribed using voice recognition software. Every effort was made to ensure accuracy; however, inadvertent computerized transcription errors may be present.

## 2019-07-05 VITALS
OXYGEN SATURATION: 94 % | TEMPERATURE: 97.8 F | RESPIRATION RATE: 20 BRPM | DIASTOLIC BLOOD PRESSURE: 81 MMHG | SYSTOLIC BLOOD PRESSURE: 137 MMHG | BODY MASS INDEX: 21.98 KG/M2 | WEIGHT: 145 LBS | HEART RATE: 80 BPM | HEIGHT: 68 IN

## 2019-07-05 LAB
ANION GAP SERPL CALCULATED.3IONS-SCNC: 12 MMOL/L (ref 7–16)
BASOPHILS ABSOLUTE: 0.03 E9/L (ref 0–0.2)
BASOPHILS RELATIVE PERCENT: 0.3 % (ref 0–2)
BUN BLDV-MCNC: 24 MG/DL (ref 8–23)
CALCIUM SERPL-MCNC: 8.4 MG/DL (ref 8.6–10.2)
CHLORIDE BLD-SCNC: 105 MMOL/L (ref 98–107)
CO2: 24 MMOL/L (ref 22–29)
CREAT SERPL-MCNC: 1.4 MG/DL (ref 0.7–1.2)
EOSINOPHILS ABSOLUTE: 0.03 E9/L (ref 0.05–0.5)
EOSINOPHILS RELATIVE PERCENT: 0.3 % (ref 0–6)
GFR AFRICAN AMERICAN: 57
GFR NON-AFRICAN AMERICAN: 47 ML/MIN/1.73
GLUCOSE BLD-MCNC: 144 MG/DL (ref 74–99)
HCT VFR BLD CALC: 40.6 % (ref 37–54)
HEMOGLOBIN: 13.2 G/DL (ref 12.5–16.5)
IMMATURE GRANULOCYTES #: 0.07 E9/L
IMMATURE GRANULOCYTES %: 0.8 % (ref 0–5)
LYMPHOCYTES ABSOLUTE: 0.93 E9/L (ref 1.5–4)
LYMPHOCYTES RELATIVE PERCENT: 10.3 % (ref 20–42)
MCH RBC QN AUTO: 30.1 PG (ref 26–35)
MCHC RBC AUTO-ENTMCNC: 32.5 % (ref 32–34.5)
MCV RBC AUTO: 92.7 FL (ref 80–99.9)
MONOCYTES ABSOLUTE: 1.21 E9/L (ref 0.1–0.95)
MONOCYTES RELATIVE PERCENT: 13.5 % (ref 2–12)
NEUTROPHILS ABSOLUTE: 6.72 E9/L (ref 1.8–7.3)
NEUTROPHILS RELATIVE PERCENT: 74.8 % (ref 43–80)
PDW BLD-RTO: 15.9 FL (ref 11.5–15)
PLATELET # BLD: 277 E9/L (ref 130–450)
PMV BLD AUTO: 11.2 FL (ref 7–12)
POTASSIUM REFLEX MAGNESIUM: 4.3 MMOL/L (ref 3.5–5)
RBC # BLD: 4.38 E12/L (ref 3.8–5.8)
SODIUM BLD-SCNC: 141 MMOL/L (ref 132–146)
WBC # BLD: 9 E9/L (ref 4.5–11.5)

## 2019-07-05 PROCEDURE — 80048 BASIC METABOLIC PNL TOTAL CA: CPT

## 2019-07-05 PROCEDURE — 97535 SELF CARE MNGMENT TRAINING: CPT

## 2019-07-05 PROCEDURE — 99024 POSTOP FOLLOW-UP VISIT: CPT | Performed by: ORTHOPAEDIC SURGERY

## 2019-07-05 PROCEDURE — 97112 NEUROMUSCULAR REEDUCATION: CPT

## 2019-07-05 PROCEDURE — 97530 THERAPEUTIC ACTIVITIES: CPT

## 2019-07-05 PROCEDURE — 97110 THERAPEUTIC EXERCISES: CPT

## 2019-07-05 PROCEDURE — 6370000000 HC RX 637 (ALT 250 FOR IP): Performed by: STUDENT IN AN ORGANIZED HEALTH CARE EDUCATION/TRAINING PROGRAM

## 2019-07-05 PROCEDURE — 36415 COLL VENOUS BLD VENIPUNCTURE: CPT

## 2019-07-05 PROCEDURE — 85025 COMPLETE CBC W/AUTO DIFF WBC: CPT

## 2019-07-05 PROCEDURE — 2580000003 HC RX 258: Performed by: STUDENT IN AN ORGANIZED HEALTH CARE EDUCATION/TRAINING PROGRAM

## 2019-07-05 RX ADMIN — Medication 10 ML: at 08:34

## 2019-07-05 RX ADMIN — MAGNESIUM HYDROXIDE 30 ML: 400 SUSPENSION ORAL at 10:59

## 2019-07-05 RX ADMIN — ASPIRIN 81 MG: 81 TABLET ORAL at 08:33

## 2019-07-05 RX ADMIN — HYDROCODONE BITARTRATE AND ACETAMINOPHEN 1 TABLET: 5; 325 TABLET ORAL at 05:51

## 2019-07-05 ASSESSMENT — PAIN SCALES - GENERAL
PAINLEVEL_OUTOF10: 5
PAINLEVEL_OUTOF10: 0

## 2019-07-05 NOTE — DISCHARGE INSTR - COC
Deadra Red on 7/5/19 at 8:26 AM    PHYSICIAN SECTION    Prognosis: Good    Condition at Discharge: Stable    Rehab Potential (if transferring to Rehab): {Prognosis:[id number]}    Recommended Labs or Other Treatments After Discharge: ***    Physician Certification: I certify the above information and transfer of Enrrique Villagomez  is necessary for the continuing treatment of the diagnosis listed and that he requires [place] for less 30 days.      Update Admission H&P: {CHP DME Changes in BBALM:[id number]}    PHYSICIAN SIGNATURE:  Electronically signed by Leigh Torres DO on 7/5/19 at 9:31 AM

## 2019-07-05 NOTE — PROGRESS NOTES
Minimal Assist    Toileting Dependent  Dep; [place] present.     Moderate Assist    Bed Mobility  Supine to sit: Maximal Assist x2  Sit to supine: Maximal Assist x2  Supine to sit: Max A x2  Sit to supine: N/T    Pt requires Max A of 2 to transfer from supine to sitting position with increased assistance due to pain in R hip. Rolling: Minimal Assist   Supine to sit: Moderate Assist   Sit to supine: Moderate Assist    Functional Transfers Mod A x2 Sit > stand: Max A of 2  Stand > Sit: Max A of 1; To transfer from sit to standing position with verbal prompts to increase proper hand placement.    Min A   Functional Mobility Mod A x2  Limited to lateral sidesteps to [facility] w/ ww. Increased time required d/t R hip pain. Assist for management of ww Min A with w/w and increased time. Pt able to ambulate from EOB through hallway and back. Min A      Balance Sitting: Min A  Standing: Mod A x2 w/ ww Sitting: SBA  Standing: Max A x 1-2 w/ ww        Activity Tolerance Poor+  Limited by pain Amandeep Owens     Pt has macular degeneration.                  Hand dominance: R    Strength ROM Additional Info:    RUE  3+/5  WFL    good  and wfl FMC/dexterity noted during ADL tasks         LUE 3+/5  WFL    good  and wfl FMC/dexterity noted during ADL tasks         Hearing: [place]  Sensation:  Unable to assess  Tone: WFL                             Comments/Treatment: Facilitated OT session in collaboration w/ PT. At end of session, patient sitting up in the chair, tray table in front of him, with call light and phone within reach, all lines and tubes intact. Pt would benefit from continued skilled OT to increase functional independence and quality of life. · Pt has made fair progress towards set goals.    · Continue with current plan of care    Time in: 11:35  Time out: 12:00  Total Tx Time: 25 mins    Beto Valentine 46, [address]

## 2019-07-06 NOTE — DISCHARGE SUMMARY
Hospital Medicine Discharge Summary    Patient ID: Ester Mccoy      Patient's PCP: No primary care provider on file. Admit Date: 7/2/2019     Discharge Date:  7/5/2019      Admitting Physician: Glynn Tavarez MD     Discharge Physician: Garfield Palencia DO      Condition at discharge: Stable    Disposition: [address]    Discharge Diagnoses: Active Hospital Problems    Diagnosis Date Noted    Closed fracture of right hip ([address] [S72.001A] 07/02/2019   Closed R hip fracture  Mechanical fall  Hx CAD s/p PCI   Hx bladder cancer  Elevated Cr - likely CKD stage III    The patient was seen and examined on day of discharge and this discharge summary is in conjunction with any daily progress note from day of discharge. Admission HPI: Mr. Estephania Lemus is a 81 yo male with a hx of CAD s/p PCI, bladder cancer who presented to the ED following a mechanical fall, having missed the last step going down some stairs and fell no his R side. Pt hit his head but did not lose consciousness. XR of the R hip in the ED demonstrates femoral neck fracture. Pt also noted to have elevated Cr of 1.7, baseline unclear. Orthopedics consulted by ED and plans on OR in am for ORIF of the hip. Pt admitted for further evaluation and treatment. Hospital Course:   Mr. Ester Mccoy, a 80y.o. year old male  who  has no past medical history on file. During the course the patient's hospital stay he was Seen by orthopedic surgery. Had ORIF of the right hip performed. PTworked with the patient. Did well in surgery. Discontinued IV fluids with time. Set up to go to [facility]. With time the patient was deemed stable for DC on 7/5/2019. he is to follow up with PCP within 1 week and with specialists as directed.      Consults:     IP CONSULT TO ORTHOPEDIC SURGERY  IP CONSULT TO INTERNAL MEDICINE  IP CONSULT TO ANESTHESIOLOGY    Significant Diagnostic Studies:  As above      Discharge Instructions/Follow-up:  As above INDICATION: Right hip pain following injury       COMPARISON: None      FINDINGS:    Bony structures are intact with preservation of alignment and joint   spacing. No focal soft tissue abnormalities are identified. Antecubital   superficial venous access is identified. IMPRESSION:   Negative for acute pathologic findings. XR FEMUR RIGHT (MIN 2 VIEWS)   Final Result   Nondisplaced intertrochanteric fracture of the proximal right femur      RIGHT HIP:      NUMBER OF IMAGES:  4 views      INDICATION: Right hip pain following injury       COMPARISON: None      FINDINGS:    There is a nondisplaced intertrochanteric fracture of the proximal   right femur. The distal femur is unremarkable for acute findings. Degree of osteopenia is present. No trauma involving the knee is   identified. No focal soft tissue abnormalities are identified. IMPRESSION:   Nondisplaced intertrochanteric fracture of the right femur. RIGHT ELBOW:      NUMBER OF IMAGES:  4 views      INDICATION: Right hip pain following injury       COMPARISON: None      FINDINGS:    Bony structures are intact with preservation of alignment and joint   spacing. No focal soft tissue abnormalities are identified. Antecubital   superficial venous access is identified. IMPRESSION:   Negative for acute pathologic findings. XR ELBOW RIGHT (MIN 3 VIEWS)   Final Result   Nondisplaced intertrochanteric fracture of the proximal right femur      RIGHT HIP:      NUMBER OF IMAGES:  4 views      INDICATION: Right hip pain following injury       COMPARISON: None      FINDINGS:    There is a nondisplaced intertrochanteric fracture of the proximal   right femur. The distal femur is unremarkable for acute findings. Degree of osteopenia is present. No trauma involving the knee is   identified. No focal soft tissue abnormalities are identified.       IMPRESSION:   Nondisplaced intertrochanteric fracture of the right

## 2019-07-26 ENCOUNTER — APPOINTMENT (OUTPATIENT)
Dept: CT IMAGING | Age: 84
DRG: 470 | End: 2019-07-26
Payer: MEDICARE

## 2019-07-26 ENCOUNTER — HOSPITAL ENCOUNTER (INPATIENT)
Age: 84
LOS: 4 days | Discharge: NURSING, REHAB OR LONG-TERM CARE | DRG: 470 | End: 2019-07-30
Attending: EMERGENCY MEDICINE | Admitting: INTERNAL MEDICINE
Payer: MEDICARE

## 2019-07-26 ENCOUNTER — APPOINTMENT (OUTPATIENT)
Dept: GENERAL RADIOLOGY | Age: 84
DRG: 470 | End: 2019-07-26
Payer: MEDICARE

## 2019-07-26 DIAGNOSIS — S72.002A CLOSED FRACTURE OF NECK OF LEFT FEMUR, INITIAL ENCOUNTER (HCC): Primary | ICD-10-CM

## 2019-07-26 PROBLEM — S72.92XA CLOSED FRACTURE OF LEFT FEMUR (HCC): Status: ACTIVE | Noted: 2019-07-26

## 2019-07-26 LAB
ABO/RH: NORMAL
ANION GAP SERPL CALCULATED.3IONS-SCNC: 12 MMOL/L (ref 7–16)
ANTIBODY SCREEN: NORMAL
BASOPHILS ABSOLUTE: 0.03 E9/L (ref 0–0.2)
BASOPHILS RELATIVE PERCENT: 0.3 % (ref 0–2)
BUN BLDV-MCNC: 22 MG/DL (ref 8–23)
CALCIUM SERPL-MCNC: 8.9 MG/DL (ref 8.6–10.2)
CHLORIDE BLD-SCNC: 103 MMOL/L (ref 98–107)
CO2: 25 MMOL/L (ref 22–29)
CREAT SERPL-MCNC: 1.4 MG/DL (ref 0.7–1.2)
EOSINOPHILS ABSOLUTE: 0 E9/L (ref 0.05–0.5)
EOSINOPHILS RELATIVE PERCENT: 0 % (ref 0–6)
GFR AFRICAN AMERICAN: 57
GFR NON-AFRICAN AMERICAN: 47 ML/MIN/1.73
GLUCOSE BLD-MCNC: 97 MG/DL (ref 74–99)
HCT VFR BLD CALC: 39.5 % (ref 37–54)
HEMOGLOBIN: 12.8 G/DL (ref 12.5–16.5)
IMMATURE GRANULOCYTES #: 0.06 E9/L
IMMATURE GRANULOCYTES %: 0.6 % (ref 0–5)
LYMPHOCYTES ABSOLUTE: 0.69 E9/L (ref 1.5–4)
LYMPHOCYTES RELATIVE PERCENT: 6.9 % (ref 20–42)
MCH RBC QN AUTO: 30.8 PG (ref 26–35)
MCHC RBC AUTO-ENTMCNC: 32.4 % (ref 32–34.5)
MCV RBC AUTO: 95.2 FL (ref 80–99.9)
MONOCYTES ABSOLUTE: 1.09 E9/L (ref 0.1–0.95)
MONOCYTES RELATIVE PERCENT: 11 % (ref 2–12)
NEUTROPHILS ABSOLUTE: 8.06 E9/L (ref 1.8–7.3)
NEUTROPHILS RELATIVE PERCENT: 81.2 % (ref 43–80)
PDW BLD-RTO: 16.3 FL (ref 11.5–15)
PLATELET # BLD: 381 E9/L (ref 130–450)
PMV BLD AUTO: 10.2 FL (ref 7–12)
POTASSIUM REFLEX MAGNESIUM: 4.9 MMOL/L (ref 3.5–5)
RBC # BLD: 4.15 E12/L (ref 3.8–5.8)
REASON FOR REJECTION: NORMAL
REJECTED TEST: NORMAL
SODIUM BLD-SCNC: 140 MMOL/L (ref 132–146)
WBC # BLD: 9.9 E9/L (ref 4.5–11.5)

## 2019-07-26 PROCEDURE — 36415 COLL VENOUS BLD VENIPUNCTURE: CPT

## 2019-07-26 PROCEDURE — 70450 CT HEAD/BRAIN W/O DYE: CPT

## 2019-07-26 PROCEDURE — 2580000003 HC RX 258: Performed by: INTERNAL MEDICINE

## 2019-07-26 PROCEDURE — 85730 THROMBOPLASTIN TIME PARTIAL: CPT

## 2019-07-26 PROCEDURE — 86901 BLOOD TYPING SEROLOGIC RH(D): CPT

## 2019-07-26 PROCEDURE — 6360000002 HC RX W HCPCS: Performed by: EMERGENCY MEDICINE

## 2019-07-26 PROCEDURE — 99221 1ST HOSP IP/OBS SF/LOW 40: CPT | Performed by: ORTHOPAEDIC SURGERY

## 2019-07-26 PROCEDURE — 72125 CT NECK SPINE W/O DYE: CPT

## 2019-07-26 PROCEDURE — 85610 PROTHROMBIN TIME: CPT

## 2019-07-26 PROCEDURE — 86900 BLOOD TYPING SEROLOGIC ABO: CPT

## 2019-07-26 PROCEDURE — 6360000002 HC RX W HCPCS: Performed by: INTERNAL MEDICINE

## 2019-07-26 PROCEDURE — 85025 COMPLETE CBC W/AUTO DIFF WBC: CPT

## 2019-07-26 PROCEDURE — 86850 RBC ANTIBODY SCREEN: CPT

## 2019-07-26 PROCEDURE — 71045 X-RAY EXAM CHEST 1 VIEW: CPT

## 2019-07-26 PROCEDURE — 73502 X-RAY EXAM HIP UNI 2-3 VIEWS: CPT

## 2019-07-26 PROCEDURE — 80048 BASIC METABOLIC PNL TOTAL CA: CPT

## 2019-07-26 PROCEDURE — 99285 EMERGENCY DEPT VISIT HI MDM: CPT

## 2019-07-26 PROCEDURE — 93005 ELECTROCARDIOGRAM TRACING: CPT | Performed by: INTERNAL MEDICINE

## 2019-07-26 PROCEDURE — 73552 X-RAY EXAM OF FEMUR 2/>: CPT

## 2019-07-26 PROCEDURE — 2060000000 HC ICU INTERMEDIATE R&B

## 2019-07-26 RX ORDER — SODIUM CHLORIDE 0.9 % (FLUSH) 0.9 %
10 SYRINGE (ML) INJECTION PRN
Status: DISCONTINUED | OUTPATIENT
Start: 2019-07-26 | End: 2019-07-31 | Stop reason: HOSPADM

## 2019-07-26 RX ORDER — SODIUM CHLORIDE 0.9 % (FLUSH) 0.9 %
10 SYRINGE (ML) INJECTION EVERY 12 HOURS SCHEDULED
Status: DISCONTINUED | OUTPATIENT
Start: 2019-07-26 | End: 2019-07-31 | Stop reason: HOSPADM

## 2019-07-26 RX ORDER — MORPHINE SULFATE 4 MG/ML
4 INJECTION, SOLUTION INTRAMUSCULAR; INTRAVENOUS ONCE
Status: COMPLETED | OUTPATIENT
Start: 2019-07-26 | End: 2019-07-26

## 2019-07-26 RX ORDER — ONDANSETRON 2 MG/ML
4 INJECTION INTRAMUSCULAR; INTRAVENOUS EVERY 6 HOURS PRN
Status: DISCONTINUED | OUTPATIENT
Start: 2019-07-26 | End: 2019-07-31 | Stop reason: HOSPADM

## 2019-07-26 RX ORDER — MORPHINE SULFATE 2 MG/ML
2 INJECTION, SOLUTION INTRAMUSCULAR; INTRAVENOUS EVERY 4 HOURS PRN
Status: DISCONTINUED | OUTPATIENT
Start: 2019-07-26 | End: 2019-07-31 | Stop reason: HOSPADM

## 2019-07-26 RX ADMIN — Medication 10 ML: at 22:45

## 2019-07-26 RX ADMIN — MORPHINE SULFATE 4 MG: 4 INJECTION, SOLUTION INTRAMUSCULAR; INTRAVENOUS at 19:28

## 2019-07-26 RX ADMIN — MORPHINE SULFATE 2 MG: 2 INJECTION, SOLUTION INTRAMUSCULAR; INTRAVENOUS at 21:18

## 2019-07-26 ASSESSMENT — PAIN SCALES - GENERAL
PAINLEVEL_OUTOF10: 0
PAINLEVEL_OUTOF10: 10
PAINLEVEL_OUTOF10: 10
PAINLEVEL_OUTOF10: 0
PAINLEVEL_OUTOF10: 9

## 2019-07-26 ASSESSMENT — PAIN DESCRIPTION - DESCRIPTORS: DESCRIPTORS: SHARP

## 2019-07-26 ASSESSMENT — PAIN DESCRIPTION - ORIENTATION: ORIENTATION: LEFT

## 2019-07-26 ASSESSMENT — PAIN DESCRIPTION - FREQUENCY: FREQUENCY: CONTINUOUS

## 2019-07-26 ASSESSMENT — PAIN DESCRIPTION - PAIN TYPE: TYPE: ACUTE PAIN

## 2019-07-26 ASSESSMENT — PAIN DESCRIPTION - LOCATION: LOCATION: HIP;LEG

## 2019-07-26 NOTE — CONSULTS
range of motion and bone pain  NEUROLOGICAL:  negative for headaches, dizziness  BEHAVIOR/PSYCH:  negative for increased agitation and anxiety    PHYSICAL EXAM:    VITALS:  BP (!) 155/86   Pulse 93   Temp 97.6 °F (36.4 °C) (Temporal)   Resp 18   Ht 5' 8\" (1.727 m)   Wt 140 lb (63.5 kg)   SpO2 93%   BMI 21.29 kg/m²   CONSTITUTIONAL:  awake, alert, cooperative, no apparent distress, and appears stated age  MUSCULOSKELETAL:  Left lower Extremity:   Skin is intact circumferentially  +TTP about the left hip   Compartments soft and compressible  Positive log roll  Palpable dorsalis pedis and posterior tibialis pulse, brisk cap refill to toes, foot warm and perfused  Sensation intact to light touch in sural/deep peroneal/superficial peroneal/saphenous/posterior tibial nerve distributions to foot/ankle  Demonstrates active ankle plantar/dorsiflexion/great toe extension    Secondary Exam:   · bilateralUE: No obvious signs of trauma. -TTP to fingers, hand, wrist, forearm, elbow, humerus, shoulder or clavicle. -- Patient able to flex/extend fingers, wrist, elbow and shoulder with active and passive ROM without pain, +2/4 Radial pulse, cap refill <3sec, +AIN/PIN/Radial/Ulnar/Median N, distal sensation grossly intact to C4-T1 dermatomes, compartments soft and compressible. · rightLE: No obvious signs of trauma. Previous surgical incisions clean/dry and intact without surrounding erythema or warmth   -TTP to foot, ankle, leg, knee, thigh, hip.-- Patient able to flex/extend toes, ankle, knee and hip with active and passive ROM without pain,+2/4 DP & PT pulses, cap refill <3sec, +5/5 PF/DF/EHL, distal sensation grossly intact to L4-S1 dermatomes, compartments soft and compressible.     · Pelvis: -TTP, -Log roll, -Heel strike     DATA:    CBC:   Lab Results   Component Value Date    WBC 9.0 07/05/2019    RBC 4.38 07/05/2019    HGB 13.2 07/05/2019    HCT 40.6 07/05/2019    MCV 92.7 07/05/2019    MCH 30.1 07/05/2019    [facility]

## 2019-07-26 NOTE — ED PROVIDER NOTES
vital signs as below have been reviewed. BP (!) 163/78   Pulse 86   Temp 99.3 °F (37.4 °C) (Oral)   Resp 16   Ht 5' 8\" (1.727 m)   Wt 140 lb (63.5 kg)   SpO2 94%   BMI 21.29 kg/m²   Oxygen Saturation Interpretation: Normal      ---------------------------------------------------PHYSICAL EXAM--------------------------------------      Constitutional/General: Alert and oriented x3, well appearing, non toxic in NAD  Head: Normocephalic and atraumatic  Eyes: PERRL, EOMI  Mouth: Oropharynx clear, handling secretions, no trismus  Neck: Supple, full ROM, no cervical spine tenderness  Pulmonary: Lungs clear to auscultation bilaterally, no wheezes, rales, or rhonchi. Not in respiratory distress  Cardiovascular:  Regular rate and rhythm, no murmurs, gallops, or rubs. 2+ distal pulses  Abdomen: Soft, non tender, non distended,   Extremities: Moves all extremities x 4. Warm and well perfused.  LLE- diminished ROM to his left hip, tenderness to anterior femur and lateral hip, normal sensation, strong DP and PT pulses  Skin: warm and dry without rash  Neurologic: GCS 15, no focal motor or sensory deficits   Psych: Normal Affect      ------------------------------ ED COURSE/MEDICAL DECISION MAKING----------------------  Medications   ceFAZolin (ANCEF) 2 g in dextrose 5 % 50 mL IVPB (has no administration in time range)   sodium chloride flush 0.9 % injection 10 mL (has no administration in time range)   sodium chloride flush 0.9 % injection 10 mL (has no administration in time range)   magnesium hydroxide (MILK OF MAGNESIA) 400 MG/5ML suspension 30 mL (has no administration in time range)   ondansetron (ZOFRAN) injection 4 mg (has no administration in time range)   morphine (PF) injection 2 mg (2 mg Intravenous Given 7/26/19 2118)   morphine sulfate (PF) injection 4 mg (4 mg Intravenous Given 7/26/19 1928)        Medical Decision Making/ED COURSE:   Patient is a 72-year-old male presenting after mechanical fall with left

## 2019-07-27 ENCOUNTER — ANESTHESIA EVENT (OUTPATIENT)
Dept: OPERATING ROOM | Age: 84
DRG: 470 | End: 2019-07-27
Payer: MEDICARE

## 2019-07-27 ENCOUNTER — APPOINTMENT (OUTPATIENT)
Dept: GENERAL RADIOLOGY | Age: 84
DRG: 470 | End: 2019-07-27
Payer: MEDICARE

## 2019-07-27 ENCOUNTER — ANESTHESIA (OUTPATIENT)
Dept: OPERATING ROOM | Age: 84
DRG: 470 | End: 2019-07-27
Payer: MEDICARE

## 2019-07-27 VITALS — DIASTOLIC BLOOD PRESSURE: 50 MMHG | SYSTOLIC BLOOD PRESSURE: 97 MMHG | OXYGEN SATURATION: 99 %

## 2019-07-27 PROBLEM — N18.9 CKD (CHRONIC KIDNEY DISEASE): Status: ACTIVE | Noted: 2019-07-27

## 2019-07-27 LAB
ANION GAP SERPL CALCULATED.3IONS-SCNC: 18 MMOL/L (ref 7–16)
APTT: 28.7 SEC (ref 24.5–35.1)
BUN BLDV-MCNC: 22 MG/DL (ref 8–23)
CALCIUM SERPL-MCNC: 9 MG/DL (ref 8.6–10.2)
CHLORIDE BLD-SCNC: 99 MMOL/L (ref 98–107)
CO2: 25 MMOL/L (ref 22–29)
CREAT SERPL-MCNC: 1.4 MG/DL (ref 0.7–1.2)
EKG ATRIAL RATE: 86 BPM
EKG P AXIS: 66 DEGREES
EKG P-R INTERVAL: 192 MS
EKG Q-T INTERVAL: 390 MS
EKG QRS DURATION: 130 MS
EKG QTC CALCULATION (BAZETT): 466 MS
EKG R AXIS: 35 DEGREES
EKG T AXIS: -177 DEGREES
EKG VENTRICULAR RATE: 86 BPM
GFR AFRICAN AMERICAN: 57
GFR NON-AFRICAN AMERICAN: 47 ML/MIN/1.73
GLUCOSE BLD-MCNC: 68 MG/DL (ref 74–99)
HCT VFR BLD CALC: 40 % (ref 37–54)
HEMOGLOBIN: 12.9 G/DL (ref 12.5–16.5)
INR BLD: 1.1
MCH RBC QN AUTO: 30.9 PG (ref 26–35)
MCHC RBC AUTO-ENTMCNC: 32.3 % (ref 32–34.5)
MCV RBC AUTO: 95.9 FL (ref 80–99.9)
PDW BLD-RTO: 16.2 FL (ref 11.5–15)
PLATELET # BLD: 394 E9/L (ref 130–450)
PMV BLD AUTO: 10.6 FL (ref 7–12)
POTASSIUM REFLEX MAGNESIUM: 5.3 MMOL/L (ref 3.5–5)
PROTHROMBIN TIME: 13 SEC (ref 9.3–12.4)
RBC # BLD: 4.17 E12/L (ref 3.8–5.8)
SODIUM BLD-SCNC: 142 MMOL/L (ref 132–146)
WBC # BLD: 9.7 E9/L (ref 4.5–11.5)

## 2019-07-27 PROCEDURE — 80048 BASIC METABOLIC PNL TOTAL CA: CPT

## 2019-07-27 PROCEDURE — 6360000002 HC RX W HCPCS: Performed by: ORTHOPAEDIC SURGERY

## 2019-07-27 PROCEDURE — 3600000005 HC SURGERY LEVEL 5 BASE: Performed by: ORTHOPAEDIC SURGERY

## 2019-07-27 PROCEDURE — 3700000000 HC ANESTHESIA ATTENDED CARE: Performed by: ORTHOPAEDIC SURGERY

## 2019-07-27 PROCEDURE — 85027 COMPLETE CBC AUTOMATED: CPT

## 2019-07-27 PROCEDURE — 2709999900 HC NON-CHARGEABLE SUPPLY: Performed by: ORTHOPAEDIC SURGERY

## 2019-07-27 PROCEDURE — 2500000003 HC RX 250 WO HCPCS: Performed by: ANESTHESIOLOGIST ASSISTANT

## 2019-07-27 PROCEDURE — 6360000002 HC RX W HCPCS: Performed by: ANESTHESIOLOGIST ASSISTANT

## 2019-07-27 PROCEDURE — 3600000015 HC SURGERY LEVEL 5 ADDTL 15MIN: Performed by: ORTHOPAEDIC SURGERY

## 2019-07-27 PROCEDURE — 7100000000 HC PACU RECOVERY - FIRST 15 MIN: Performed by: ORTHOPAEDIC SURGERY

## 2019-07-27 PROCEDURE — 7100000001 HC PACU RECOVERY - ADDTL 15 MIN: Performed by: ORTHOPAEDIC SURGERY

## 2019-07-27 PROCEDURE — 2060000000 HC ICU INTERMEDIATE R&B

## 2019-07-27 PROCEDURE — 27236 TREAT THIGH FRACTURE: CPT | Performed by: ORTHOPAEDIC SURGERY

## 2019-07-27 PROCEDURE — 36415 COLL VENOUS BLD VENIPUNCTURE: CPT

## 2019-07-27 PROCEDURE — C1776 JOINT DEVICE (IMPLANTABLE): HCPCS | Performed by: ORTHOPAEDIC SURGERY

## 2019-07-27 PROCEDURE — 73502 X-RAY EXAM HIP UNI 2-3 VIEWS: CPT

## 2019-07-27 PROCEDURE — 0SRS01A REPLACEMENT OF LEFT HIP JOINT, FEMORAL SURFACE WITH METAL SYNTHETIC SUBSTITUTE, UNCEMENTED, OPEN APPROACH: ICD-10-PCS | Performed by: ORTHOPAEDIC SURGERY

## 2019-07-27 PROCEDURE — 2580000003 HC RX 258: Performed by: INTERNAL MEDICINE

## 2019-07-27 PROCEDURE — 6370000000 HC RX 637 (ALT 250 FOR IP): Performed by: ORTHOPAEDIC SURGERY

## 2019-07-27 PROCEDURE — 2580000003 HC RX 258: Performed by: ANESTHESIOLOGIST ASSISTANT

## 2019-07-27 PROCEDURE — 3700000001 HC ADD 15 MINUTES (ANESTHESIA): Performed by: ORTHOPAEDIC SURGERY

## 2019-07-27 PROCEDURE — 93010 ELECTROCARDIOGRAM REPORT: CPT | Performed by: INTERNAL MEDICINE

## 2019-07-27 PROCEDURE — 88311 DECALCIFY TISSUE: CPT

## 2019-07-27 PROCEDURE — 88305 TISSUE EXAM BY PATHOLOGIST: CPT

## 2019-07-27 DEVICE — STEM FEM SZ 6 L111MM NK L35MM 45MM OFFSET 127DEG HIP TI: Type: IMPLANTABLE DEVICE | Site: HIP | Status: FUNCTIONAL

## 2019-07-27 DEVICE — HEAD FEM DIA26MM -3MM OFFSET HIP CO CHROM POLYETH TAPR LO: Type: IMPLANTABLE DEVICE | Site: HIP | Status: FUNCTIONAL

## 2019-07-27 DEVICE — HEAD FEM OD52MM ID26MM HIP CO CHROM POLYETH BPLR CEMENTLESS: Type: IMPLANTABLE DEVICE | Site: HIP | Status: FUNCTIONAL

## 2019-07-27 RX ORDER — PROPOFOL 10 MG/ML
INJECTION, EMULSION INTRAVENOUS CONTINUOUS PRN
Status: DISCONTINUED | OUTPATIENT
Start: 2019-07-27 | End: 2019-07-27 | Stop reason: SDUPTHER

## 2019-07-27 RX ORDER — SODIUM CHLORIDE 9 MG/ML
INJECTION, SOLUTION INTRAVENOUS CONTINUOUS PRN
Status: DISCONTINUED | OUTPATIENT
Start: 2019-07-27 | End: 2019-07-27 | Stop reason: SDUPTHER

## 2019-07-27 RX ORDER — ONDANSETRON 2 MG/ML
4 INJECTION INTRAMUSCULAR; INTRAVENOUS
Status: DISCONTINUED | OUTPATIENT
Start: 2019-07-27 | End: 2019-07-27 | Stop reason: HOSPADM

## 2019-07-27 RX ORDER — VANCOMYCIN HYDROCHLORIDE 1 G/20ML
INJECTION, POWDER, LYOPHILIZED, FOR SOLUTION INTRAVENOUS PRN
Status: DISCONTINUED | OUTPATIENT
Start: 2019-07-27 | End: 2019-07-27 | Stop reason: ALTCHOICE

## 2019-07-27 RX ORDER — ASPIRIN 325 MG
325 TABLET, DELAYED RELEASE (ENTERIC COATED) ORAL 2 TIMES DAILY WITH MEALS
Qty: 60 TABLET | Refills: 1 | Status: ON HOLD | OUTPATIENT
Start: 2019-07-27 | End: 2019-09-18

## 2019-07-27 RX ORDER — MORPHINE SULFATE 10 MG/ML
INJECTION, SOLUTION INTRAMUSCULAR; INTRAVENOUS PRN
Status: DISCONTINUED | OUTPATIENT
Start: 2019-07-27 | End: 2019-07-27 | Stop reason: SDUPTHER

## 2019-07-27 RX ORDER — CEFAZOLIN SODIUM 1 G/3ML
INJECTION, POWDER, FOR SOLUTION INTRAMUSCULAR; INTRAVENOUS PRN
Status: DISCONTINUED | OUTPATIENT
Start: 2019-07-27 | End: 2019-07-27 | Stop reason: SDUPTHER

## 2019-07-27 RX ORDER — BUPIVACAINE HYDROCHLORIDE 7.5 MG/ML
INJECTION, SOLUTION INTRASPINAL PRN
Status: DISCONTINUED | OUTPATIENT
Start: 2019-07-27 | End: 2019-07-27 | Stop reason: SDUPTHER

## 2019-07-27 RX ORDER — HYDROCODONE BITARTRATE AND ACETAMINOPHEN 5; 325 MG/1; MG/1
1 TABLET ORAL EVERY 6 HOURS PRN
Qty: 12 TABLET | Refills: 0 | Status: SHIPPED | OUTPATIENT
Start: 2019-07-27 | End: 2019-07-30

## 2019-07-27 RX ORDER — MORPHINE SULFATE 2 MG/ML
2 INJECTION, SOLUTION INTRAMUSCULAR; INTRAVENOUS EVERY 4 HOURS PRN
Status: DISCONTINUED | OUTPATIENT
Start: 2019-07-27 | End: 2019-07-31 | Stop reason: HOSPADM

## 2019-07-27 RX ORDER — MEPERIDINE HYDROCHLORIDE 50 MG/ML
12.5 INJECTION INTRAMUSCULAR; INTRAVENOUS; SUBCUTANEOUS EVERY 5 MIN PRN
Status: DISCONTINUED | OUTPATIENT
Start: 2019-07-27 | End: 2019-07-27 | Stop reason: HOSPADM

## 2019-07-27 RX ORDER — HYDROCODONE BITARTRATE AND ACETAMINOPHEN 5; 325 MG/1; MG/1
1 TABLET ORAL EVERY 4 HOURS PRN
Status: DISCONTINUED | OUTPATIENT
Start: 2019-07-27 | End: 2019-07-31 | Stop reason: HOSPADM

## 2019-07-27 RX ADMIN — PHENYLEPHRINE HYDROCHLORIDE 300 MCG: 10 INJECTION INTRAVENOUS at 19:04

## 2019-07-27 RX ADMIN — PHENYLEPHRINE HYDROCHLORIDE 200 MCG: 10 INJECTION INTRAVENOUS at 18:34

## 2019-07-27 RX ADMIN — PHENYLEPHRINE HYDROCHLORIDE 200 MCG: 10 INJECTION INTRAVENOUS at 18:32

## 2019-07-27 RX ADMIN — PHENYLEPHRINE HYDROCHLORIDE 200 MCG: 10 INJECTION INTRAVENOUS at 18:50

## 2019-07-27 RX ADMIN — SODIUM CHLORIDE: 9 INJECTION, SOLUTION INTRAVENOUS at 17:50

## 2019-07-27 RX ADMIN — MORPHINE SULFATE 0.15 MG: 10 INJECTION INTRAVENOUS at 17:54

## 2019-07-27 RX ADMIN — CEFAZOLIN 2000 MG: 1 INJECTION, POWDER, FOR SOLUTION INTRAMUSCULAR; INTRAVENOUS at 18:10

## 2019-07-27 RX ADMIN — PHENYLEPHRINE HYDROCHLORIDE 100 MCG: 10 INJECTION INTRAVENOUS at 18:06

## 2019-07-27 RX ADMIN — PHENYLEPHRINE HYDROCHLORIDE 200 MCG: 10 INJECTION INTRAVENOUS at 19:14

## 2019-07-27 RX ADMIN — Medication 10 ML: at 10:21

## 2019-07-27 RX ADMIN — PHENYLEPHRINE HYDROCHLORIDE 100 MCG: 10 INJECTION INTRAVENOUS at 18:04

## 2019-07-27 RX ADMIN — PHENYLEPHRINE HYDROCHLORIDE 100 MCG: 10 INJECTION INTRAVENOUS at 18:10

## 2019-07-27 RX ADMIN — PHENYLEPHRINE HYDROCHLORIDE 200 MCG: 10 INJECTION INTRAVENOUS at 18:57

## 2019-07-27 RX ADMIN — PHENYLEPHRINE HYDROCHLORIDE 100 MCG: 10 INJECTION INTRAVENOUS at 19:19

## 2019-07-27 RX ADMIN — PHENYLEPHRINE HYDROCHLORIDE 200 MCG: 10 INJECTION INTRAVENOUS at 18:45

## 2019-07-27 RX ADMIN — SODIUM CHLORIDE: 9 INJECTION, SOLUTION INTRAVENOUS at 19:28

## 2019-07-27 RX ADMIN — PROPOFOL 50 MCG/KG/MIN: 10 INJECTION, EMULSION INTRAVENOUS at 17:54

## 2019-07-27 RX ADMIN — SODIUM CHLORIDE: 9 INJECTION, SOLUTION INTRAVENOUS at 18:48

## 2019-07-27 RX ADMIN — PHENYLEPHRINE HYDROCHLORIDE 100 MCG: 10 INJECTION INTRAVENOUS at 18:09

## 2019-07-27 RX ADMIN — PHENYLEPHRINE HYDROCHLORIDE 100 MCG: 10 INJECTION INTRAVENOUS at 17:56

## 2019-07-27 RX ADMIN — PHENYLEPHRINE HYDROCHLORIDE 200 MCG: 10 INJECTION INTRAVENOUS at 17:59

## 2019-07-27 RX ADMIN — PHENYLEPHRINE HYDROCHLORIDE 100 MCG: 10 INJECTION INTRAVENOUS at 18:02

## 2019-07-27 RX ADMIN — BUPIVACAINE HYDROCHLORIDE IN DEXTROSE 1.8 ML: 7.5 INJECTION, SOLUTION SUBARACHNOID at 17:54

## 2019-07-27 ASSESSMENT — PULMONARY FUNCTION TESTS
PIF_VALUE: 0
PIF_VALUE: 1
PIF_VALUE: 0
PIF_VALUE: 1
PIF_VALUE: 0

## 2019-07-27 ASSESSMENT — PAIN SCALES - GENERAL
PAINLEVEL_OUTOF10: 0

## 2019-07-27 ASSESSMENT — PAIN DESCRIPTION - LOCATION: LOCATION: HIP

## 2019-07-27 ASSESSMENT — PAIN DESCRIPTION - PAIN TYPE: TYPE: SURGICAL PAIN

## 2019-07-27 ASSESSMENT — PAIN DESCRIPTION - ORIENTATION: ORIENTATION: LEFT

## 2019-07-27 NOTE — ANESTHESIA PROCEDURE NOTES
Spinal Block    Patient location during procedure: OR  Reason for block: primary anesthetic and at surgeon's request  Staffing  Anesthesiologist: Radha Carroll MD  Preanesthetic Checklist  Completed: patient identified, site marked, surgical consent, pre-op evaluation, timeout performed, IV checked, risks and benefits discussed, monitors and equipment checked, anesthesia consent given, oxygen available and patient being monitored  Spinal Block  Patient position: left lateral decubitus  Prep: ChloraPrep  Patient monitoring: cardiac monitor, continuous pulse ox, continuous capnometry and frequent blood pressure checks  Approach: midline  Location: L3/L4  Provider prep: mask, sterile gloves and sterile gown  Local infiltration: lidocaine  Dose: 0.3  Agent: bupivacaine  Adjuvant: duramorph  Dose: 1.8  Dose: 1.8  Needle  Needle type: Pencan   Needle gauge: 25 G  Needle length: 3.5 in  Assessment  Sensory level: T6  Swirl obtained: Yes  CSF: clear  Attempts: 1  Hemodynamics: stable

## 2019-07-27 NOTE — PROGRESS NOTES
Hospital Medicine History & Physical      PCP: No primary care provider on file. Chief Complaint:  fall    History Of Present Illness:   81 yo man with a history of CAD, bladder Ca, HTN,   prior right hip fracture , presents following a fall and now Left hip fracture. Denies any loss of consciousness he reports he lost his balance after he stood up from a sitting position. Ct head/ cervical spine shows no acute process. Hip/ femur xray shows minimally displaced left femoral neck fracture. He was seen by orthopedics in the ER and plan is ORIF vs hemiarthroplasty. Subjective   Patient is lying in bed in no acute distress. Was recently discharged from rehab for right hip surgery rehabilitation. Plans for surgery for left hip OR today. Denies any chest pain or SOB while at rehab for previous surgery. Past Medical History:          Diagnosis Date    Bl[address] [facility] [address] [address]     CAD (coronary artery disease)     Hypertension     Myocardial infarct [facility])        Past Surgical History:          Procedure Laterality Date    HIP FRACTURE SURGERY Right 7/3/2019    HIP OPEN REDUCTION INTERNAL FIXATION performed by Gloria Dominguez MD at [place] [address] [facility]         Medications Prior to Admission:      Prior to Admission medications    Medication Sig Start Date End Date Taking? Authorizing Provider   aspirin EC 81 MG EC tablet Take 1 tablet by mouth 2 times daily for 28 days 7/3/19 7/31/19  Nazario Schofield DO       Allergies:  Patient has no known allergies. Social History:      TOBACCO:   reports that he has never smoked. He has never used smokeless tobacco.  ETOH:   reports that he does not drink alcohol. REVIEW OF SYSTEMS:   Pertinent positives as noted in the HPI. All other systems reviewed and negative.     PHYSICAL EXAM:    /82   Pulse 82   Temp 98.7 °F (37.1 °C) (Oral)   Resp 18   Ht 5' 8\" (1.727 m)   Wt 140 lb (63.5 kg)   SpO2 97%   BMI 21.29 kg/m²   General appearance: No apparent

## 2019-07-28 PROCEDURE — 6360000002 HC RX W HCPCS: Performed by: STUDENT IN AN ORGANIZED HEALTH CARE EDUCATION/TRAINING PROGRAM

## 2019-07-28 PROCEDURE — 6370000000 HC RX 637 (ALT 250 FOR IP): Performed by: STUDENT IN AN ORGANIZED HEALTH CARE EDUCATION/TRAINING PROGRAM

## 2019-07-28 PROCEDURE — 2580000003 HC RX 258: Performed by: STUDENT IN AN ORGANIZED HEALTH CARE EDUCATION/TRAINING PROGRAM

## 2019-07-28 PROCEDURE — 2060000000 HC ICU INTERMEDIATE R&B

## 2019-07-28 RX ORDER — CEFAZOLIN SODIUM 1 G/50ML
1 SOLUTION INTRAVENOUS EVERY 8 HOURS
Status: COMPLETED | OUTPATIENT
Start: 2019-07-28 | End: 2019-07-28

## 2019-07-28 RX ADMIN — ASPIRIN 325 MG: 325 TABLET, COATED ORAL at 10:06

## 2019-07-28 RX ADMIN — Medication 10 ML: at 15:24

## 2019-07-28 RX ADMIN — Medication 10 ML: at 22:46

## 2019-07-28 RX ADMIN — CEFAZOLIN SODIUM 1 G: 1 SOLUTION INTRAVENOUS at 10:08

## 2019-07-28 RX ADMIN — CEFAZOLIN SODIUM 1 G: 1 SOLUTION INTRAVENOUS at 22:46

## 2019-07-28 RX ADMIN — CEFAZOLIN SODIUM 1 G: 1 SOLUTION INTRAVENOUS at 15:24

## 2019-07-28 RX ADMIN — ASPIRIN 325 MG: 325 TABLET, COATED ORAL at 18:40

## 2019-07-28 RX ADMIN — Medication 10 ML: at 10:06

## 2019-07-28 ASSESSMENT — PAIN DESCRIPTION - PAIN TYPE: TYPE: ACUTE PAIN;SURGICAL PAIN

## 2019-07-28 ASSESSMENT — PAIN SCALES - GENERAL
PAINLEVEL_OUTOF10: 0
PAINLEVEL_OUTOF10: 2
PAINLEVEL_OUTOF10: 0

## 2019-07-28 ASSESSMENT — PAIN DESCRIPTION - ONSET: ONSET: GRADUAL

## 2019-07-28 ASSESSMENT — PAIN DESCRIPTION - ORIENTATION: ORIENTATION: LEFT

## 2019-07-28 ASSESSMENT — PAIN DESCRIPTION - DESCRIPTORS: DESCRIPTORS: ACHING;DISCOMFORT

## 2019-07-28 ASSESSMENT — PAIN - FUNCTIONAL ASSESSMENT: PAIN_FUNCTIONAL_ASSESSMENT: ACTIVITIES ARE NOT PREVENTED

## 2019-07-28 ASSESSMENT — PAIN DESCRIPTION - LOCATION: LOCATION: HIP

## 2019-07-28 ASSESSMENT — PAIN DESCRIPTION - FREQUENCY: FREQUENCY: INTERMITTENT

## 2019-07-28 ASSESSMENT — PAIN DESCRIPTION - PROGRESSION: CLINICAL_PROGRESSION: NOT CHANGED

## 2019-07-28 NOTE — OP NOTE
[address]                  [address] [facility], [address] [place] [address]                                OPERATIVE REPORT    PATIENT NAME: Randee Gonzáles                   :        1928  MED REC NO:   [id number]                            ROOM:       4516  ACCOUNT NO:   [de-identified]                           ADMIT DATE: 2019  PROVIDER:     Namrata Amin DO    DATE OF PROCEDURE:  2019    PREOPERATIVE DIAGNOSIS:  Left transcervical femoral neck fracture. POSTOPERATIVE DIAGNOSIS:  Left transcervical femoral neck fracture. SURGEON:  Namrata Amin DO    ASSISTANT:  Keke Negro DO    PROCEDURE:  Left hip hemiarthroplasty. IMPLANTS:  Accolade -degree neck angle hip stem, size 6. Bipolar  universal head 52 mm outer diameter, -3 mm offset. ANESTHESIA:  Spinal.    ESTIMATED BLOOD LOSS:  150 mL. COMPLICATIONS:  None. INDICATIONS:  The patient sustained a mechanical fall and a left femoral  neck fracture. He was admitted to the ER. Orthopedics was consulted  for definitive management. The patient did have a previous right  femoral neck fracture stabilization approximately one month ago by my  partner. The patient's family elected for operative intervention for  the patient's new injury. Risks and benefits were outlined in detail  and they verbalized understanding of all that was discussed. All  questions were addressed to his satisfaction. They elected to proceed  with the procedure as outlined. DESCRIPTION OF PROCEDURE:  The patient was brought to the operating  suite. Received spinal anesthetic by Department of Anesthesia as well  as 2 gm of Ancef intravenously. Carefully transferred on the operating  table in supine position. The patient was positioned in the right  lateral decubitus position. Axillary roll was placed. Bony prominences  were well padded.   Left lower extremity hemipelvis sterilely prepped and  draped out

## 2019-07-28 NOTE — PROGRESS NOTES
4.9 5.3*    99   CO2 25 25   BUN 22 22   CREATININE 1.4* 1.4*   CALCIUM 8.9 9.0       No results for input(s): PROT, ALB, ALKPHOS, ALT, AST, BILITOT, AMYLASE, LIPASE in the last 72 hours. Recent Labs     07/26/19  2344   INR 1.1       No results for input(s): Norma Benton in the last 72 hours. Chronic labs:  Lab Results   Component Value Date    INR 1.1 07/26/2019       Radiology:  Imaging studies reviewed today. ASSESSMENT:    Active Problems:    Closed displaced fracture of left femoral neck (HCC)    Closed fracture of left femur (HCC)    Hypertension    CAD (coronary artery disease)    CKD (chronic kidney disease)  Resolved Problems:    * No resolved hospital problems. *       PLAN:  Pain control  PT/OT  Continue home meds  Monitor renal function   On perioperative antibiotics      Diet: DIET GENERAL;  Code Status: DNR-CCA  PT/OT Eval Status:   ordered  DVT Prophylaxis:   Per ortho - aspirin 325 mg bid   Recommended disposition at discharge:  NANCY    +++++++++++++++++++++++++++++++++++++++++++++++++  Sis Mathews MD   [facility].  +++++++++++++++++++++++++++++++++++++++++++++++++  NOTE: This report was transcribed using voice recognition software.  Every effort was made to ensure accuracy; however, inadvertent computerized transcription errors may be present.

## 2019-07-29 ENCOUNTER — TELEPHONE (OUTPATIENT)
Dept: ORTHOPEDIC SURGERY | Age: 84
End: 2019-07-29

## 2019-07-29 PROCEDURE — 2060000000 HC ICU INTERMEDIATE R&B

## 2019-07-29 PROCEDURE — 97530 THERAPEUTIC ACTIVITIES: CPT

## 2019-07-29 PROCEDURE — 97162 PT EVAL MOD COMPLEX 30 MIN: CPT

## 2019-07-29 PROCEDURE — 6370000000 HC RX 637 (ALT 250 FOR IP): Performed by: STUDENT IN AN ORGANIZED HEALTH CARE EDUCATION/TRAINING PROGRAM

## 2019-07-29 PROCEDURE — 2580000003 HC RX 258: Performed by: STUDENT IN AN ORGANIZED HEALTH CARE EDUCATION/TRAINING PROGRAM

## 2019-07-29 PROCEDURE — 97535 SELF CARE MNGMENT TRAINING: CPT

## 2019-07-29 PROCEDURE — 97166 OT EVAL MOD COMPLEX 45 MIN: CPT

## 2019-07-29 RX ADMIN — Medication 10 ML: at 21:03

## 2019-07-29 RX ADMIN — Medication 10 ML: at 08:39

## 2019-07-29 RX ADMIN — ASPIRIN 325 MG: 325 TABLET, COATED ORAL at 08:39

## 2019-07-29 RX ADMIN — HYDROCODONE BITARTRATE AND ACETAMINOPHEN 1 TABLET: 5; 325 TABLET ORAL at 15:43

## 2019-07-29 RX ADMIN — HYDROCODONE BITARTRATE AND ACETAMINOPHEN 1 TABLET: 5; 325 TABLET ORAL at 08:39

## 2019-07-29 RX ADMIN — Medication 10 ML: at 08:40

## 2019-07-29 RX ADMIN — ASPIRIN 325 MG: 325 TABLET, COATED ORAL at 16:58

## 2019-07-29 RX ADMIN — HYDROCODONE BITARTRATE AND ACETAMINOPHEN 1 TABLET: 5; 325 TABLET ORAL at 21:03

## 2019-07-29 ASSESSMENT — PAIN DESCRIPTION - LOCATION
LOCATION: HIP
LOCATION: LEG;HIP
LOCATION: HIP

## 2019-07-29 ASSESSMENT — PAIN DESCRIPTION - ORIENTATION
ORIENTATION: RIGHT;LEFT
ORIENTATION: LEFT
ORIENTATION: LEFT
ORIENTATION: RIGHT;LEFT
ORIENTATION: LEFT

## 2019-07-29 ASSESSMENT — PAIN - FUNCTIONAL ASSESSMENT: PAIN_FUNCTIONAL_ASSESSMENT: PREVENTS OR INTERFERES SOME ACTIVE ACTIVITIES AND ADLS

## 2019-07-29 ASSESSMENT — PAIN DESCRIPTION - FREQUENCY
FREQUENCY: CONTINUOUS
FREQUENCY: CONTINUOUS
FREQUENCY: INTERMITTENT
FREQUENCY: CONTINUOUS

## 2019-07-29 ASSESSMENT — PAIN DESCRIPTION - DESCRIPTORS
DESCRIPTORS: ACHING;CONSTANT;DISCOMFORT
DESCRIPTORS: ACHING;CRAMPING;DISCOMFORT
DESCRIPTORS: ACHING;CONSTANT;DISCOMFORT
DESCRIPTORS: ACHING;DISCOMFORT
DESCRIPTORS: ACHING;CONSTANT;DISCOMFORT
DESCRIPTORS: ACHING;CRAMPING;DISCOMFORT
DESCRIPTORS: ACHING;CONSTANT;DISCOMFORT

## 2019-07-29 ASSESSMENT — PAIN DESCRIPTION - ONSET
ONSET: ON-GOING

## 2019-07-29 ASSESSMENT — PAIN SCALES - GENERAL
PAINLEVEL_OUTOF10: 6
PAINLEVEL_OUTOF10: 6
PAINLEVEL_OUTOF10: 4
PAINLEVEL_OUTOF10: 3
PAINLEVEL_OUTOF10: 6
PAINLEVEL_OUTOF10: 5
PAINLEVEL_OUTOF10: 3
PAINLEVEL_OUTOF10: 4

## 2019-07-29 ASSESSMENT — PAIN DESCRIPTION - PAIN TYPE
TYPE: ACUTE PAIN;SURGICAL PAIN

## 2019-07-29 ASSESSMENT — PAIN DESCRIPTION - PROGRESSION: CLINICAL_PROGRESSION: NOT CHANGED

## 2019-07-29 NOTE — PROGRESS NOTES
Occupational Therapy  OCCUPATIONAL THERAPY INITIAL EVALUATION      Date:2019  Patient Name: Adama Jain  MRN: [id number]  : 1928  Room: [address] [place] [address]    Evaluating OT: Shae Gage OTR/L     AM-PAC Daily Activity Raw Score:   Recommended Adaptive Equipment: continue to assess     Comments: Based on patient's functional performance as stated above and level of assistance needed prior to admission, this therapist believes that the patient would benefit from Continued therapy for ADL retraining, strengthening, building endurance/activity tolerance and overall functioning in order to safely return home. Diagnosis: Hip Fx s/p fall  Surgery: ORIF L hip     Pertinent Medical History: R hip fx s/p fall and ORIF 7/3, ,macular degeneration, bladder ca, MI     Precautions:  Falls, WBAT LLE, [place], low vision     Home Living: Pt lives with dtr (? Paraplegic per pt but works all day) in an apt with 4+8 step(s) to enter and  rail(s); bed/bath on one level   Bathroom setup: Walk-in shower handicap accessible  Equipment owned: shower chair, [employer], W/C-pt states they have all the equipment from d/t being W/C bound    Prior Level of Function: Mod I with ADLs (pt states he did not wear socks) assist with IADLs; using [employer] for ambulation. Pt states his daughter works all day so he struggled to perform ADLs for himself while she was gone. He does not have to physically assist dtr. Pain Level: pt c/o pain in L hip   Cognition: A&O: 3; Follows 1 step directions with fair consistency; cues required at times to attend to task and for orientation to current situation.  Unclear if confusion is d/t [place]   Memory: F   Sequencing: F   Problem solving: F   Judgement/safety: F-        Functional Assessment:   Initial Eval Status  Date:  Treatment Status  Date: Short Term Goals  Treatment frequency: 1-3x/week on PRN    Feeding Setup A        Grooming/Hygiene Min A seated   Mod I   while seated     UB Dressing Mod A overall   SBA  Seated EOB      LB Dressing DEP  Pt states he does not wear socks at baseline just slip on slippers    Mod A   with AE PRN   Bathing Max A     Toileting Max A  Pt able to use urinal in bed but requires assist with all other aspects of toileting while standing at bedside   Mod A  Including all clothing mgmt    Bed Mobility  Supine to sit: Max Ax2  Sit to supine: Max Ax2     Functional Transfers Sit to stand: Max Ax2  Stand to sit: Max Ax2  Mod cues for proper hand placement, sequencing and safety technique      Mod A  From varying surfaces with G safety    Functional Mobility NT; pt unable to advance LEs   Mod A   with AD short distances at bedside to and from [address] Sitting:      Static: SBA    Dynamic:SBA  Standing: Max A to stand while performing toileting task      Endurance/Activity Tolerance Fair-; limited by pain and generalized weakness    F+    during 15-20 min ADL task    Visual/  Perceptual Glasses: no; broken in fall but pt states he has macular degeneration and they do not help much.    pts vision is poor unable to see clock on wall or any writing on therapists badge        UE strengthening    See UE Assessment Below  G tolerance  For BUE AROM/AAROM in all planes to improve overall function for ADL tasks      UE Assessment  Hand dominance: R       Strength ROM  Comments   RUE  Proximal: 3/5  Distal: 4/5 Proximal:grossly WFL  Distal: WFL F+  and F FMC/dexterity noted during ADL tasks   LUE Proximal: 3/5  Distal: 4/5 Proximal: grossly WFL  Distal: WFL F+  and F FMC/dexterity noted during ADL tasks     Hearing: [place]  Sensation:  No c/o numbness or tingling  Tone: generalized weakness  Edema: Mild in RLE-no PCD's present in patients room                             Comments/Treatment Narrative: Upon arrival pt supine in bed. After Bed mobility, dressing task attempt, Functional activity as noted above, pt left in bed with all devices within reach, all lines and tubes intact. Pt properly positioned using pillows and bed mechanics to improve interaction with environment, overall functioning and decrease/prevent edema and contractures. During functional activites and ADLs pt educated on proper hand placement, safety technique, sequencing and energy conservation techniques/pain mgmt techs. Pt  additionally educated on OT POC, OT role, Importance of completing ADL tasks daily as IND as possible to aide in recovery process, fall risk precautions, and proper positioning in bed. Eval Complexity:   mod Complexity  · History: Expanded review of medical records and additional review of physical, cognitive, or psychosocial history related to current functional performance  · Exam: 3+ performance deficits  · Assistance/Modification: Min/mod assistance or modifications required to perform tasks. May have comorbidities that affect occupational performance. Assessment of current deficits:   Functional mobility [x]  ADLs [x] Strength [x]  Cognition [x]  Functional transfers  [x] IADLs [x] Safety Awareness [x]  Endurance [x]  Fine Motor Coordination [x] Balance [x] Vision/perception [] Sensation []   Gross Motor Coordination [] ROM [x] Delirium []                  Motor Control []    Plan of Care:  ADL retraining [x]   Equipment needs [x]   Neuromuscular re-education [] Energy Conservation Techniques [x]  Functional Transfer training [x] Patient and/or Family Education [x]  Functional Mobility training [x]  Environmental Modifications [x]  Cognitive re-training [x]   Compensatory techniques for ADLs [x]  Splinting Needs []   Positioning to improve overall function [x]   Therapeutic Activity [x]                       Therapeutic Exercise  [x]  Visual/Perceptual: []    Delirium prevention/treatment  [x]   Other:  []    Rehab Potential: Good for established goals    Patient / Family Goal: No goals specifically stated at this time; no family present.      Patient and/or family were

## 2019-07-29 NOTE — CARE COORDINATION
Spoke with patient at bedside to discuss transition of care. He stated he lives with his daughter. He had a recent stay at [facility] after ORIF rt hip about a month ago. PT/OT on, awaiting evals. Left message with daughter Bella Bone regarding discharge planning. WBAT LLE. Post op ORIF left hip 7/27.  Will await call back from daughter

## 2019-07-29 NOTE — PROGRESS NOTES
Department of Orthopedic Surgery  Resident Progress Note    Patient seen and examined. Pain controlled. No new complaints. Denies chest pain, shortness of breath, dizziness/lightheadedness. Patient confused this morning. VITALS:  BP (!) 147/70   Pulse 80   Temp 97.6 °F (36.4 °C) (Oral)   Resp 16   Ht 5' 8\" (1.727 m)   Wt 140 lb (63.5 kg)   SpO2 96%   BMI 21.29 kg/m²     General: In no acute distress    MUSCULOSKELETAL:   left lower extremity:  · HAMILTON dressing C/D/I, functioning and reinforced  · Compartments soft and compressible  · +PF/DF/EHL  · +2/4 DP & PT pulses, Brisk Cap refill, Toes warm and perfused  · Distal sensation grossly intact to Peroneals, Sural, Saphenous, and tibial nrs    CBC:   Lab Results   Component Value Date    WBC 9.7 07/27/2019    HGB 12.9 07/27/2019    HCT 40.0 07/27/2019     07/27/2019     PT/INR:    Lab Results   Component Value Date    PROTIME 13.0 07/26/2019    INR 1.1 07/26/2019         ASSESSMENT  · S/P L HHA - 7/27/19    PLAN      · Continue physical therapy and protocol: WBAT - LLE  · 24 hour abx coverage  · Deep venous thrombosis prophylaxis - , early mobilization  · PT/OT  · Pain Control: IV and PO  · Monitor H&H  · Okay to discharge from an orthopedic standpoint with follow up  · D/C Plan:  Pending SW/PT recs    Orthopaedic Attending    I have seen and evaluated the patient with the resident and agree with the above assessments on today's visit. I have performed the key components of the history and physical examination and concur completely with the findings and plans as documented above.     Electronically signed by   Kevin Iglesias DO  7/29/2019

## 2019-07-30 VITALS
WEIGHT: 140 LBS | HEIGHT: 68 IN | BODY MASS INDEX: 21.22 KG/M2 | RESPIRATION RATE: 20 BRPM | OXYGEN SATURATION: 97 % | HEART RATE: 80 BPM | SYSTOLIC BLOOD PRESSURE: 132 MMHG | DIASTOLIC BLOOD PRESSURE: 70 MMHG | TEMPERATURE: 98.4 F

## 2019-07-30 PROCEDURE — 97530 THERAPEUTIC ACTIVITIES: CPT

## 2019-07-30 PROCEDURE — 6370000000 HC RX 637 (ALT 250 FOR IP): Performed by: STUDENT IN AN ORGANIZED HEALTH CARE EDUCATION/TRAINING PROGRAM

## 2019-07-30 PROCEDURE — 97112 NEUROMUSCULAR REEDUCATION: CPT

## 2019-07-30 PROCEDURE — 2580000003 HC RX 258: Performed by: STUDENT IN AN ORGANIZED HEALTH CARE EDUCATION/TRAINING PROGRAM

## 2019-07-30 PROCEDURE — 97535 SELF CARE MNGMENT TRAINING: CPT

## 2019-07-30 RX ADMIN — HYDROCODONE BITARTRATE AND ACETAMINOPHEN 1 TABLET: 5; 325 TABLET ORAL at 11:01

## 2019-07-30 RX ADMIN — Medication 10 ML: at 11:01

## 2019-07-30 RX ADMIN — ASPIRIN 325 MG: 325 TABLET, COATED ORAL at 17:13

## 2019-07-30 RX ADMIN — ASPIRIN 325 MG: 325 TABLET, COATED ORAL at 09:00

## 2019-07-30 ASSESSMENT — PAIN DESCRIPTION - PAIN TYPE: TYPE: ACUTE PAIN;SURGICAL PAIN

## 2019-07-30 ASSESSMENT — PAIN SCALES - GENERAL
PAINLEVEL_OUTOF10: 4
PAINLEVEL_OUTOF10: 0
PAINLEVEL_OUTOF10: 8

## 2019-07-30 ASSESSMENT — PAIN - FUNCTIONAL ASSESSMENT: PAIN_FUNCTIONAL_ASSESSMENT: PREVENTS OR INTERFERES SOME ACTIVE ACTIVITIES AND ADLS

## 2019-07-30 ASSESSMENT — PAIN DESCRIPTION - ORIENTATION: ORIENTATION: LEFT

## 2019-07-30 ASSESSMENT — PAIN DESCRIPTION - LOCATION: LOCATION: HIP;LEG

## 2019-07-30 ASSESSMENT — PAIN DESCRIPTION - ONSET: ONSET: ON-GOING

## 2019-07-30 ASSESSMENT — PAIN DESCRIPTION - FREQUENCY: FREQUENCY: INTERMITTENT

## 2019-07-30 ASSESSMENT — PAIN DESCRIPTION - PROGRESSION: CLINICAL_PROGRESSION: NOT CHANGED

## 2019-07-30 ASSESSMENT — PAIN DESCRIPTION - DESCRIPTORS: DESCRIPTORS: ACHING;DISCOMFORT;DULL

## 2019-07-30 NOTE — CARE COORDINATION
Spoke with daughter, Lm Smith, discussed medicaid process. Based on information provided, he will likely qualify for medicaid. No assets, no saving, $2400 a month pension. Discussed with her signing over pension check to facility. Discussed NANCY options. She was unsure about [facility] and asked for referral to [facility] Referral pending. Daughter notified of possible referral later today. If [facility] cannot accept back next choice is [facility].

## 2019-07-30 NOTE — DISCHARGE INSTR - COC
Signs: BP (!) 133/96   Pulse 83   Temp 98.7 °F (37.1 °C) (Temporal)   Resp 16   Ht 5' 8\" (1.727 m)   Wt 140 lb (63.5 kg)   SpO2 95%   BMI 21.29 kg/m²     Last documented pain score (0-10 scale): Pain Level: 8  Last Weight:   Wt Readings from Last 1 Encounters:   07/26/19 140 lb (63.5 kg)     Mental Status:  {IP PT MENTAL STATUS:20030}    IV Access:  { MOSES IV ACCESS:418717629}    Nursing Mobility/ADLs:  Walking   {CHP DME WBOA:353866156}  Transfer  {CHP DME IYHH:463717229}  Bathing  {CHP DME GJDU:503970047}  Dressing  {CHP DME SYBL:475053880}  Toileting  {CHP DME DJKM:349241036}  Feeding  {CHP DME JUAE:422502674}  Med Admin  {P DME INUW:016793903}  Med Delivery   { MOSES MED Delivery:317918382}    Wound Care Documentation and Therapy:        Elimination:  Continence:   · Bowel: {YES / DD:58579}  · Bladder: {YES / XR:86229}  Urinary Catheter: {Urinary Catheter:173989478}   Colostomy/Ileostomy/Ileal Conduit: {YES / PP:42927}       Date of Last BM: ***    Intake/Output Summary (Last 24 hours) at 7/30/2019 1123  Last data filed at 7/30/2019 1005  Gross per 24 hour   Intake 300 ml   Output 820 ml   Net -520 ml     I/O last 3 completed shifts:   In: 500 [P.O.:500]  Out: 470 [Urine:470]    Safety Concerns:     [address] Safety Concerns:144135065}    Impairments/Disabilities:      [address] Impairments/Disabilities:842091607}    Nutrition Therapy:  Current Nutrition Therapy:   [address] Diet List:510447006}    Routes of Feeding: {CHP DME Other Feedings:126728872}  Liquids: {Slp liquid thickness:36343}  Daily Fluid Restriction: {CHP DME Yes amt example:405563751}  Last Modified Barium Swallow with Video (Video Swallowing Test): {Done Not Done NKHW:373738006}    Treatments at the Time of Hospital Discharge:   Respiratory Treatments: ***  Oxygen Therapy:  {Therapy; copd oxygen:89554}  Ventilator:    { CC Vent CLQV:946268266}    Rehab Therapies: {THERAPEUTIC INTERVENTION:0721719814}  Weight Bearing Status/Restrictions: {

## 2019-08-05 ENCOUNTER — TELEPHONE (OUTPATIENT)
Dept: ADMINISTRATIVE | Age: 84
End: 2019-08-05

## 2019-08-05 NOTE — TELEPHONE ENCOUNTER
Patient is under the care of Dr Nadia Soto. He performed the surgery. Dr Verena Garcia appointment was made in error. In appointments patient is already scheduled with Dr Nadia Soto.   Please make necessary corrections

## 2019-08-05 NOTE — TELEPHONE ENCOUNTER
Dr. Jose Sauceda please advise:    Patient has had 2 hip fractures    Right Femoral Neck Fx - ORIF Right Femoral Neck 7/04/2019 RJB    Left Transcervical Femoral Neck Fx - Left Hip Hemiarthroplasty 7/27/2019 Kristen Rivas    Patient was a no show for his post op appointment with you d/t he was in the hospital with a left hip fx. Patient has 3 follow up appointments scheduled with Dr. Tasia Faith. Question: When do you want to see this patient for his post op visit ORIF Right Hip?   The patient is a resident at [facility]

## 2019-08-07 ENCOUNTER — TELEPHONE (OUTPATIENT)
Dept: ORTHOPEDIC SURGERY | Age: 84
End: 2019-08-07

## 2019-08-07 DIAGNOSIS — S72.001A CLOSED FRACTURE OF RIGHT HIP, INITIAL ENCOUNTER (HCC): Primary | ICD-10-CM

## 2019-08-07 DIAGNOSIS — S72.002A CLOSED FRACTURE OF NECK OF LEFT FEMUR, INITIAL ENCOUNTER (HCC): ICD-10-CM

## 2019-08-07 NOTE — TELEPHONE ENCOUNTER
Left message with unit secretary to call back, please either transfer call to [facility] or can speak with me directly.   Electronically signed by Chris Martines PA-C on 8/7/2019 at 11:31 AM

## 2019-08-07 NOTE — TELEPHONE ENCOUNTER
8/07/2019 9:15 am Per LYNN: Sent message to Dr. Inez Carver. Patient will follow up with him [address].  Cancel appointment with me scheduled for August 26 th.

## 2019-08-07 NOTE — TELEPHONE ENCOUNTER
Received call back from Michell, patient's nurse [facility] [place]. She states that she did observe a puddle of clear drainage on his bedding from the left hip. She is unable to quantify the amount. She states there was no blood, no purulence and no odor to this. The left hip incision is without surrounding erythema or warmth. They are doing dressing changes daily, and as needed. They have not had any increased frequency of dressing changes. To her knowledge there has not been complete saturation of dressings at daily changes. Patient is not having pain, and denies fevers, chills. Advised to continue to monitor drainage and if increasing in frequency or changing in appearance to please let our office know so that we may reassess the incision sooner if needed.   Electronically signed by Frank Parham PA-C on 8/7/2019 at 12:03 PM

## 2019-08-12 ENCOUNTER — TELEPHONE (OUTPATIENT)
Dept: ORTHOPEDIC SURGERY | Age: 84
End: 2019-08-12

## 2019-08-12 NOTE — TELEPHONE ENCOUNTER
Shanique De La Cruz from [address] calling stating that incision has increased clear drainage and redness around incision / they had called Friday also    Patient has go't  This Friday and should he start ABX or just wait for follow up on Friday?     [phone]

## 2019-08-14 ENCOUNTER — OFFICE VISIT (OUTPATIENT)
Dept: ORTHOPEDIC SURGERY | Age: 84
End: 2019-08-14
Payer: COMMERCIAL

## 2019-08-14 ENCOUNTER — HOSPITAL ENCOUNTER (OUTPATIENT)
Dept: GENERAL RADIOLOGY | Age: 84
Discharge: HOME | End: 2019-08-16
Payer: COMMERCIAL

## 2019-08-14 VITALS — DIASTOLIC BLOOD PRESSURE: 73 MMHG | SYSTOLIC BLOOD PRESSURE: 140 MMHG | RESPIRATION RATE: 18 BRPM | HEART RATE: 80 BPM

## 2019-08-14 DIAGNOSIS — S72.002A CLOSED FRACTURE OF NECK OF LEFT FEMUR, INITIAL ENCOUNTER (HCC): ICD-10-CM

## 2019-08-14 DIAGNOSIS — S72.002A CLOSED DISPLACED FRACTURE OF LEFT FEMORAL NECK (HCC): Primary | ICD-10-CM

## 2019-08-14 DIAGNOSIS — S72.001A CLOSED FRACTURE OF RIGHT HIP, INITIAL ENCOUNTER (HCC): ICD-10-CM

## 2019-08-14 PROCEDURE — 99024 POSTOP FOLLOW-UP VISIT: CPT | Performed by: NURSE PRACTITIONER

## 2019-08-14 PROCEDURE — 73502 X-RAY EXAM HIP UNI 2-3 VIEWS: CPT

## 2019-08-14 RX ORDER — DOXYCYCLINE HYCLATE 100 MG
100 TABLET ORAL 2 TIMES DAILY
Qty: 20 TABLET | Refills: 0 | Status: ON HOLD | OUTPATIENT
Start: 2019-08-14 | End: 2019-08-27 | Stop reason: HOSPADM

## 2019-08-14 NOTE — PROGRESS NOTES
OP: DATE OF PROCEDURE:  07/27/2019  SURGEON:  Namrata Amin DO  PROCEDURE:  Left hip hemiarthroplasty. Subjective:  Yulisa Angela is approximately 2 weeks follow-up from the above surgery. He is being evaluated today per the facility request for drainage from the incision site that seem sto be worsening. Patient is WBAT on that extremity. Pain to extremity is moderate and is  taking pain medication, Tylenol as needed. He denies numbness, tingling, weakness. Denies Calf pain. Patient continues to use DVT prophylaxis, Aspirin. Patient is  participating in therapy at the nursing facility. States he is up with a walker at the facility with therapy. Patient denies any numbness or tingling down the left leg. He denies any fevers or chills. Review of Systems -    General ROS: negative for - chills, fatigue, fever or night sweats  Respiratory ROS: no cough, shortness of breath, or wheezing  Cardiovascular ROS: no chest pain or dyspnea on exertion  Gastrointestinal ROS: no abdominal pain, nausea, vomiting, diarrhea, constipation,or black or bloody stools  Genitourinary: no hematuria, dysuria, or incontinence   Musculoskeletal ROS: negative for -back or neck pain or stiffness, also see HPI  Neurological ROS: no TIA or stroke symptoms     Objective:    General: Alert and oriented X 3, normocephalic atraumatic, external ears and eye normal, sclera clear, no acute distress, respirations easy and unlabored with no audible wheezes, skin warm and dry, speech and dress appropriate for noted age, affect euthymic. Extremity:  Left Lower Extremity  Skin clean dry and intact, without signs of infection  Incisions well approximated without signs of redness, warmth. - staples intact and not ready for removal at this time. Moderate amount of serosanguinous drainage from the incision. Saturated dressing was removed.   No edema noted, no erythema or ecchymosis  Compartments supple throughout thigh and leg,   Calf supple and

## 2019-08-15 ENCOUNTER — TELEPHONE (OUTPATIENT)
Dept: ORTHOPEDIC SURGERY | Age: 84
End: 2019-08-15

## 2019-08-20 NOTE — DISCHARGE SUMMARY
commands     Consults:     IP CONSULT TO ORTHOPEDIC SURGERY  IP CONSULT TO PRIMARY CARE PROVIDER  IP CONSULT TO ANESTHESIOLOGY    Significant Diagnostic Studies:     XR HIP LEFT (2-3 VIEWS)   Final Result      Status post left hip arthroplasty which appears to be anatomical   position. .      XR CHEST 1 VW   Final Result   1. Discrete increased density in both bases above discussed. CT Head WO Contrast   Final Result      NO ACUTE INTRACRANIAL PROCESS         CT Cervical Spine WO Contrast   Final Result      No acute fracture or spondylolisthesis is identified on CT of cervical   spine. Diffuse degenerative disc and facet disease result in no severe   central or foraminal stenosis. Atherosclerotic vascular disease. XR HIP 2-3 VW W PELVIS LEFT   Final Result   Minimally displaced left femoral neck fracture. XR FEMUR LEFT (MIN 2 VIEWS)   Final Result   Minimally displaced left femoral neck fracture. Disposition:  SNF     Discharge Instructions/Follow-up:  Keep scheduled follow up appointments. Take medications as prescribed     Code Status:  Prior     Activity: activity as tolerated    Diet: regular diet    Labs: For convenience and continuity at follow-up the following most recent labs are provided:      CBC:    Lab Results   Component Value Date    WBC 9.7 07/27/2019    HGB 12.9 07/27/2019    HCT 40.0 07/27/2019     07/27/2019       Renal:    Lab Results   Component Value Date     07/27/2019    K 5.3 07/27/2019    CL 99 07/27/2019    CO2 25 07/27/2019    BUN 22 07/27/2019    CREATININE 1.4 07/27/2019    CALCIUM 9.0 07/27/2019       Discharge Medications:     Discharge Medication List as of 7/30/2019  3:15 PM           Details   HYDROcodone-acetaminophen (NORCO) 5-325 MG per tablet Take 1 tablet by mouth every 6 hours as needed for Pain for up to 3 days. Intended supply: 3 days.  Take lowest dose possible to manage pain, Disp-12 tablet, R-0Print              Details

## 2019-08-21 ENCOUNTER — TELEPHONE (OUTPATIENT)
Dept: ORTHOPEDIC SURGERY | Age: 84
End: 2019-08-21

## 2019-08-22 ENCOUNTER — APPOINTMENT (OUTPATIENT)
Dept: ULTRASOUND IMAGING | Age: 84
DRG: 863 | End: 2019-08-22
Payer: MEDICARE

## 2019-08-22 ENCOUNTER — HOSPITAL ENCOUNTER (INPATIENT)
Age: 84
LOS: 5 days | Discharge: NURSING, REHAB OR LONG-TERM CARE | DRG: 863 | End: 2019-08-27
Attending: EMERGENCY MEDICINE | Admitting: FAMILY MEDICINE
Payer: MEDICARE

## 2019-08-22 ENCOUNTER — APPOINTMENT (OUTPATIENT)
Dept: GENERAL RADIOLOGY | Age: 84
DRG: 863 | End: 2019-08-22
Payer: MEDICARE

## 2019-08-22 DIAGNOSIS — Z98.890 STATUS POST HIP SURGERY: ICD-10-CM

## 2019-08-22 DIAGNOSIS — M96.842 POSTOPERATIVE SEROMA OF MUSCULOSKELETAL STRUCTURE AFTER MUSCULOSKELETAL PROCEDURE: ICD-10-CM

## 2019-08-22 DIAGNOSIS — L03.116 CELLULITIS OF LEFT LOWER EXTREMITY: ICD-10-CM

## 2019-08-22 DIAGNOSIS — M25.552 PAIN OF LEFT HIP JOINT: Primary | ICD-10-CM

## 2019-08-22 PROBLEM — R60.1 ANASARCA: Status: ACTIVE | Noted: 2019-08-22

## 2019-08-22 PROBLEM — M96.89 POSTOPERATIVE SURGICAL COMPLICATION INVOLVING MUSCULOSKELETAL SYSTEM ASSOCIATED WITH MUSCULOSKELETAL PROCEDURE: Status: ACTIVE | Noted: 2019-08-22

## 2019-08-22 PROBLEM — E88.09 EDEMA DUE TO HYPOALBUMINEMIA: Status: ACTIVE | Noted: 2019-08-22

## 2019-08-22 PROBLEM — E44.0 MODERATE PROTEIN-CALORIE MALNUTRITION (HCC): Status: ACTIVE | Noted: 2019-08-22

## 2019-08-22 LAB
ALBUMIN SERPL-MCNC: 2.6 G/DL (ref 3.5–5.2)
ALP BLD-CCNC: 201 U/L (ref 40–129)
ALT SERPL-CCNC: 12 U/L (ref 0–40)
ANION GAP SERPL CALCULATED.3IONS-SCNC: 9 MMOL/L (ref 7–16)
AST SERPL-CCNC: 27 U/L (ref 0–39)
BASOPHILS ABSOLUTE: 0.02 E9/L (ref 0–0.2)
BASOPHILS RELATIVE PERCENT: 0.2 % (ref 0–2)
BILIRUB SERPL-MCNC: 0.6 MG/DL (ref 0–1.2)
BUN BLDV-MCNC: 37 MG/DL (ref 8–23)
C-REACTIVE PROTEIN: 3.2 MG/DL (ref 0–0.4)
CALCIUM SERPL-MCNC: 8.6 MG/DL (ref 8.6–10.2)
CHLORIDE BLD-SCNC: 101 MMOL/L (ref 98–107)
CO2: 31 MMOL/L (ref 22–29)
CREAT SERPL-MCNC: 1.4 MG/DL (ref 0.7–1.2)
EOSINOPHILS ABSOLUTE: 0 E9/L (ref 0.05–0.5)
EOSINOPHILS RELATIVE PERCENT: 0 % (ref 0–6)
GFR AFRICAN AMERICAN: 57
GFR NON-AFRICAN AMERICAN: 47 ML/MIN/1.73
GLUCOSE BLD-MCNC: 114 MG/DL (ref 74–99)
HCT VFR BLD CALC: 39.6 % (ref 37–54)
HEMOGLOBIN: 12.7 G/DL (ref 12.5–16.5)
IMMATURE GRANULOCYTES #: 0.3 E9/L
IMMATURE GRANULOCYTES %: 3.2 % (ref 0–5)
LACTIC ACID, SEPSIS: 1.4 MMOL/L (ref 0.5–1.9)
LIPASE: 13 U/L (ref 13–60)
LYMPHOCYTES ABSOLUTE: 0.83 E9/L (ref 1.5–4)
LYMPHOCYTES RELATIVE PERCENT: 8.8 % (ref 20–42)
MCH RBC QN AUTO: 30.3 PG (ref 26–35)
MCHC RBC AUTO-ENTMCNC: 32.1 % (ref 32–34.5)
MCV RBC AUTO: 94.5 FL (ref 80–99.9)
MONOCYTES ABSOLUTE: 1 E9/L (ref 0.1–0.95)
MONOCYTES RELATIVE PERCENT: 10.6 % (ref 2–12)
NEUTROPHILS ABSOLUTE: 7.31 E9/L (ref 1.8–7.3)
NEUTROPHILS RELATIVE PERCENT: 77.2 % (ref 43–80)
PDW BLD-RTO: 16 FL (ref 11.5–15)
PLATELET # BLD: 497 E9/L (ref 130–450)
PMV BLD AUTO: 9.8 FL (ref 7–12)
POTASSIUM REFLEX MAGNESIUM: 4.3 MMOL/L (ref 3.5–5)
RBC # BLD: 4.19 E12/L (ref 3.8–5.8)
SEDIMENTATION RATE, ERYTHROCYTE: 15 MM/HR (ref 0–15)
SODIUM BLD-SCNC: 141 MMOL/L (ref 132–146)
TOTAL PROTEIN: 6.3 G/DL (ref 6.4–8.3)
WBC # BLD: 9.5 E9/L (ref 4.5–11.5)

## 2019-08-22 PROCEDURE — 1200000000 HC SEMI PRIVATE

## 2019-08-22 PROCEDURE — 2580000003 HC RX 258: Performed by: NURSE PRACTITIONER

## 2019-08-22 PROCEDURE — 85025 COMPLETE CBC W/AUTO DIFF WBC: CPT

## 2019-08-22 PROCEDURE — 6360000002 HC RX W HCPCS: Performed by: FAMILY MEDICINE

## 2019-08-22 PROCEDURE — 36415 COLL VENOUS BLD VENIPUNCTURE: CPT

## 2019-08-22 PROCEDURE — 85651 RBC SED RATE NONAUTOMATED: CPT

## 2019-08-22 PROCEDURE — P9047 ALBUMIN (HUMAN), 25%, 50ML: HCPCS | Performed by: FAMILY MEDICINE

## 2019-08-22 PROCEDURE — 80053 COMPREHEN METABOLIC PANEL: CPT

## 2019-08-22 PROCEDURE — 6370000000 HC RX 637 (ALT 250 FOR IP): Performed by: EMERGENCY MEDICINE

## 2019-08-22 PROCEDURE — 83690 ASSAY OF LIPASE: CPT

## 2019-08-22 PROCEDURE — 6370000000 HC RX 637 (ALT 250 FOR IP): Performed by: FAMILY MEDICINE

## 2019-08-22 PROCEDURE — 87077 CULTURE AEROBIC IDENTIFY: CPT

## 2019-08-22 PROCEDURE — 87186 SC STD MICRODIL/AGAR DIL: CPT

## 2019-08-22 PROCEDURE — 87070 CULTURE OTHR SPECIMN AEROBIC: CPT

## 2019-08-22 PROCEDURE — 86140 C-REACTIVE PROTEIN: CPT

## 2019-08-22 PROCEDURE — 72170 X-RAY EXAM OF PELVIS: CPT

## 2019-08-22 PROCEDURE — 2580000003 HC RX 258: Performed by: FAMILY MEDICINE

## 2019-08-22 PROCEDURE — 87040 BLOOD CULTURE FOR BACTERIA: CPT

## 2019-08-22 PROCEDURE — 83605 ASSAY OF LACTIC ACID: CPT

## 2019-08-22 PROCEDURE — 93971 EXTREMITY STUDY: CPT

## 2019-08-22 PROCEDURE — 99285 EMERGENCY DEPT VISIT HI MDM: CPT

## 2019-08-22 RX ORDER — DOCUSATE SODIUM 100 MG/1
100 CAPSULE, LIQUID FILLED ORAL DAILY
Status: DISCONTINUED | OUTPATIENT
Start: 2019-08-22 | End: 2019-08-27 | Stop reason: HOSPADM

## 2019-08-22 RX ORDER — ALBUMIN (HUMAN) 12.5 G/50ML
25 SOLUTION INTRAVENOUS DAILY
Status: COMPLETED | OUTPATIENT
Start: 2019-08-22 | End: 2019-08-24

## 2019-08-22 RX ORDER — CEFAZOLIN SODIUM 2 G/50ML
2 SOLUTION INTRAVENOUS ONCE
Status: DISCONTINUED | OUTPATIENT
Start: 2019-08-22 | End: 2019-08-22

## 2019-08-22 RX ORDER — ACETAMINOPHEN 325 MG/1
650 TABLET ORAL EVERY 6 HOURS PRN
Status: ON HOLD | COMMUNITY
End: 2019-09-19

## 2019-08-22 RX ORDER — OXYCODONE HYDROCHLORIDE AND ACETAMINOPHEN 5; 325 MG/1; MG/1
1 TABLET ORAL ONCE
Status: COMPLETED | OUTPATIENT
Start: 2019-08-22 | End: 2019-08-22

## 2019-08-22 RX ORDER — ONDANSETRON 2 MG/ML
4 INJECTION INTRAMUSCULAR; INTRAVENOUS EVERY 6 HOURS PRN
Status: DISCONTINUED | OUTPATIENT
Start: 2019-08-22 | End: 2019-08-27 | Stop reason: HOSPADM

## 2019-08-22 RX ORDER — ONDANSETRON 4 MG/1
4 TABLET, ORALLY DISINTEGRATING ORAL ONCE
Status: COMPLETED | OUTPATIENT
Start: 2019-08-22 | End: 2019-08-22

## 2019-08-22 RX ORDER — HYDROCODONE BITARTRATE AND ACETAMINOPHEN 5; 325 MG/1; MG/1
1 TABLET ORAL EVERY 6 HOURS PRN
Status: ON HOLD | COMMUNITY
End: 2019-08-27 | Stop reason: SDUPTHER

## 2019-08-22 RX ORDER — HYDROCODONE BITARTRATE AND ACETAMINOPHEN 5; 325 MG/1; MG/1
1 TABLET ORAL EVERY 6 HOURS PRN
Status: DISCONTINUED | OUTPATIENT
Start: 2019-08-22 | End: 2019-08-27 | Stop reason: HOSPADM

## 2019-08-22 RX ORDER — SODIUM CHLORIDE 0.9 % (FLUSH) 0.9 %
10 SYRINGE (ML) INJECTION PRN
Status: DISCONTINUED | OUTPATIENT
Start: 2019-08-22 | End: 2019-08-27 | Stop reason: HOSPADM

## 2019-08-22 RX ORDER — ACETAMINOPHEN 325 MG/1
650 TABLET ORAL EVERY 4 HOURS PRN
Status: DISCONTINUED | OUTPATIENT
Start: 2019-08-22 | End: 2019-08-27 | Stop reason: HOSPADM

## 2019-08-22 RX ORDER — HEPARIN SODIUM 10000 [USP'U]/ML
5000 INJECTION, SOLUTION INTRAVENOUS; SUBCUTANEOUS EVERY 8 HOURS
Status: DISCONTINUED | OUTPATIENT
Start: 2019-08-22 | End: 2019-08-27 | Stop reason: HOSPADM

## 2019-08-22 RX ORDER — SODIUM CHLORIDE 0.9 % (FLUSH) 0.9 %
10 SYRINGE (ML) INJECTION EVERY 12 HOURS SCHEDULED
Status: DISCONTINUED | OUTPATIENT
Start: 2019-08-22 | End: 2019-08-27 | Stop reason: HOSPADM

## 2019-08-22 RX ADMIN — CEFEPIME HYDROCHLORIDE 1 G: 1 INJECTION, POWDER, FOR SOLUTION INTRAMUSCULAR; INTRAVENOUS at 17:06

## 2019-08-22 RX ADMIN — OXYCODONE HYDROCHLORIDE AND ACETAMINOPHEN 1 TABLET: 5; 325 TABLET ORAL at 09:18

## 2019-08-22 RX ADMIN — ONDANSETRON 4 MG: 4 TABLET, ORALLY DISINTEGRATING ORAL at 09:18

## 2019-08-22 RX ADMIN — Medication 10 ML: at 21:06

## 2019-08-22 RX ADMIN — VANCOMYCIN HYDROCHLORIDE 1250 MG: 10 INJECTION, POWDER, LYOPHILIZED, FOR SOLUTION INTRAVENOUS at 23:17

## 2019-08-22 RX ADMIN — ALBUMIN (HUMAN) 25 G: 0.25 INJECTION, SOLUTION INTRAVENOUS at 21:06

## 2019-08-22 RX ADMIN — DOCUSATE SODIUM 100 MG: 100 CAPSULE, LIQUID FILLED ORAL at 17:06

## 2019-08-22 ASSESSMENT — PAIN DESCRIPTION - DESCRIPTORS: DESCRIPTORS: SORE

## 2019-08-22 ASSESSMENT — ENCOUNTER SYMPTOMS
RESPIRATORY NEGATIVE: 1
BACK PAIN: 0
EYES NEGATIVE: 1
GASTROINTESTINAL NEGATIVE: 1

## 2019-08-22 ASSESSMENT — PAIN SCALES - GENERAL
PAINLEVEL_OUTOF10: 0
PAINLEVEL_OUTOF10: 10
PAINLEVEL_OUTOF10: 3
PAINLEVEL_OUTOF10: 0

## 2019-08-22 ASSESSMENT — PAIN DESCRIPTION - PROGRESSION: CLINICAL_PROGRESSION: GRADUALLY IMPROVING

## 2019-08-22 ASSESSMENT — PAIN DESCRIPTION - LOCATION
LOCATION: HIP
LOCATION: HIP

## 2019-08-22 ASSESSMENT — PAIN DESCRIPTION - ORIENTATION
ORIENTATION: LEFT
ORIENTATION: LEFT;RIGHT

## 2019-08-22 ASSESSMENT — PAIN DESCRIPTION - PAIN TYPE
TYPE: ACUTE PAIN
TYPE: ACUTE PAIN

## 2019-08-22 ASSESSMENT — PAIN SCALES - WONG BAKER: WONGBAKER_NUMERICALRESPONSE: 8

## 2019-08-22 ASSESSMENT — PAIN DESCRIPTION - FREQUENCY
FREQUENCY: CONTINUOUS
FREQUENCY: CONTINUOUS

## 2019-08-22 NOTE — CONSULTS
headaches, dizziness  BEHAVIOR/PSYCH:  negative for increased agitation and anxiety    PHYSICAL EXAM:    VITALS:  /71   Pulse 81   Temp 97.7 °F (36.5 °C) (Oral)   Resp 16   Wt 140 lb (63.5 kg)   SpO2 96%   BMI 21.29 kg/m²   CONSTITUTIONAL:  Awake and alert. Alert and oriented x self only  MUSCULOSKELETAL:  Left lower Extremity:  Incision still with staples, some serous drainage on dressing  Redness surrounding staples  No fluctuants noted  No expressible pus  Some pain to palpation  On PROM, patient has some knee pain and hip pain that he states are equal.  Weak AROM in all planes of hip flexion, extension, abduction  Compartments soft and compressible, calf non-tender  Brisk Cap refill, Toes warm and perfused  Sensation grossly intact superficial/deep peroneal,saphenous,sural,tibial n. distributions  · +GS/TA/EHL. · +TTP about the knee    Secondary Exam:   bilateralUE: No obvious signs of trauma. -TTP to fingers, hand, wrist, forearm, elbow, humerus, shoulder or clavicle. RightLE: No obvious signs of trauma. -TTP to foot, ankle, leg, knee, thigh, hip    · Pelvis: -TTP, -Log roll, -Heel strike     DATA:    CBC:   Lab Results   Component Value Date    WBC 9.5 08/22/2019    RBC 4.19 08/22/2019    HGB 12.7 08/22/2019    HCT 39.6 08/22/2019    MCV 94.5 08/22/2019    MCH 30.3 08/22/2019    MCHC 32.1 08/22/2019    RDW 16.0 08/22/2019     08/22/2019    MPV 9.8 08/22/2019     PT/INR:    Lab Results   Component Value Date    PROTIME 13.0 07/26/2019    INR 1.1 07/26/2019     CRP/ESR:   Lab Results   Component Value Date    CRP 3.2 08/22/2019     Lactic Acid : No results found for: [address]    Radiology Review:  08/22/19 - XR Pelvis  Previous right and left sided hip hardware that is unchanged from previous films, no fractures or dislocations are noted.     IMPRESSION:   · Left Superficial wound infection    PLAN:  WBAT - LLE  Knee xray's, patient not aware of fall but is tender  Medical Admit  IV abx

## 2019-08-22 NOTE — H&P
CAD (coronary artery disease) [I25.10]        PLAN:     Admit   Ortho consult   NPO after midnight   Continue antibiotics  Pain control   ONS  Labs in am     DVT Prophylaxis: heparin SQ  Diet: Diet NPO, After Midnight  DIET GENERAL;  Code Status: DNR-CCA    PT/OT Eval Status: ordered     Dispo - general floor admit        Edison Gagnon, APRN - CNP    Thank you Dominic Ocampo MD for the opportunity to be involved in this patient's care.  If you have any questions or concerns please feel free to contact me at [phone]

## 2019-08-22 NOTE — PLAN OF CARE
Problem: Falls - Risk of:  Goal: Will remain free from falls  Description  Will remain free from falls  Outcome: Ongoing  Goal: Absence of physical injury  Description  Absence of physical injury  Outcome: Ongoing     Problem: Skin Integrity:  Goal: Will show no infection signs and symptoms  Description  Will show no infection signs and symptoms  Outcome: Ongoing  Goal: Absence of new skin breakdown  Description  Absence of new skin breakdown  Outcome: Ongoing

## 2019-08-22 NOTE — CONSULTS
[address] [place] [address] [facility]    Consultation Note     Admit Date: 8/22/2019  8:22 AM    Reason for Consult:   Surgical wound infection; probable arthroplasty hardware involvement    Attending Physician:  Dayan Tate MD     Chief Complaint: Drainage from the incision line in the left hip arthroplasty    HISTORY OF PRESENT ILLNESS:   The patient is a 80 y.o.  man not known to the Infectious Diseases service. The patient is admitted with drainage from the left hip arthroplasty incision. This unfortunate gentleman sustained a right hip fracture on 7/2/2019 and went to surgery for the same. He had open reduction internal fixation with pins and screws. Post surgery patient had no significant problems until sustained a another fall on 726 and underwent surgery on 7/27/19  For a fractured hip with a left hip hemiarthroplasty installed. Postop he was in rehab developed some confusion and now was admitted with a significant amount of drainage from that left hip incision line ID was asked to evaluate for surgical wound infection and try to identify the depth of it to initiate definitive therapy.             Past Medical History:        Diagnosis Date    Bladder cancer ([facility] [address]     CAD (coronary artery disease)     Hypertension     Myocardial infarct [facility])      Past Surgical History:        Procedure Laterality Date    HEMIARTHROPLASTY HIP Left 7/27/2019    LEFT HIP HEMIARTHROPLASTY performed by Tsering Orr DO at [address] [place] [address] Right 7/3/2019    HIP OPEN REDUCTION INTERNAL FIXATION performed by Mary Anne Pérez MD at [address]       Current Medications:   Scheduled Meds:   sodium chloride flush  10 mL Intravenous 2 times per day    heparin (porcine)  5,000 Units Subcutaneous Q8H    albumin human  25 g Intravenous Daily    cefepime  1 g Intravenous Q12H    docusate sodium  100 mg Oral Daily    vancomycin (VANCOCIN) intermittent dosing (placeholder)   Other RX Placeholder    vancomycin  1,250 mg Intravenous Once     Continuous Infusions:  PRN Meds:sodium chloride flush, magnesium hydroxide, ondansetron, acetaminophen, HYDROcodone-acetaminophen    Allergies:  Patient has no known allergies. Social History:   Social History     Socioeconomic History    Marital status:      Spouse name: None    Number of children: None    Years of education: None    Highest education level: None   Occupational History    None   Social Needs    Financial resource strain: None    Food insecurity:     Worry: None     Inability: None    Transportation needs:     Medical: None     Non-medical: None   Tobacco Use    Smoking status: Never Smoker    Smokeless tobacco: Never Used   Substance and Sexual Activity    Alcohol use: Never     Frequency: Never    Drug use: Never    Sexual activity: None   Lifestyle    Physical activity:     Days per week: None     Minutes per session: None    Stress: None   Relationships    Social connections:     Talks on phone: None     Gets together: None     Attends Temple service: None     Active member of club or organization: None     Attends meetings of clubs or organizations: None     Relationship status: None    Intimate partner violence:     Fear of current or ex partner: None     Emotionally abused: None     Physically abused: None     Forced sexual activity: None   Other Topics Concern    None   Social History Narrative    None     Tobacco: No  Alcohol: No  Pets: No  Travel: No    Family History:   History reviewed. No pertinent family history. . Otherwise non-pertinent to the chief complaint. REVIEW OF SYSTEMS:    CONSTITUTIONAL:  No chills, fevers or night sweats. No loss of weight. EYES:  No double vision or drainage from eyes, ears or throat. HEENT:  No neck stiffness. No dysphagia. No drainage from eyes, ears or throat  RESPIRATORY:  No cough, productive sputum or hemoptysis.    CARDIOVASCULAR: on 8/22/2019 at 7:38 PM

## 2019-08-22 NOTE — ED PROVIDER NOTES
1.00 (H) 0.10 - 0.95 E9/L    Eosinophils # 0.00 (L) 0.05 - 0.50 E9/L    Basophils # 0.02 0.00 - 0.20 E9/L   Comprehensive Metabolic Panel w/ Reflex to MG   Result Value Ref Range    Sodium 141 132 - 146 mmol/L    Potassium reflex Magnesium 4.3 3.5 - 5.0 mmol/L    Chloride 101 98 - 107 mmol/L    CO2 31 (H) 22 - 29 mmol/L    Anion Gap 9 7 - 16 mmol/L    Glucose 114 (H) 74 - 99 mg/dL    BUN 37 (H) 8 - 23 mg/dL    CREATININE 1.4 (H) 0.7 - 1.2 mg/dL    GFR Non-African American 47 >=60 mL/min/1.73    GFR African American 57     Calcium 8.6 8.6 - 10.2 mg/dL    Total Protein 6.3 (L) 6.4 - 8.3 g/dL    Alb 2.6 (L) 3.5 - 5.2 g/dL    Total Bilirubin 0.6 0.0 - 1.2 mg/dL    Alkaline Phosphatase 201 (H) 40 - 129 U/L    ALT 12 0 - 40 U/L    AST 27 0 - 39 U/L   Lipase   Result Value Ref Range    Lipase 13 13 - 60 U/L   Lactate, Sepsis   Result Value Ref Range    Lactic Acid, Sepsis 1.4 0.5 - 1.9 mmol/L       RADIOLOGY:  US DUP LOWER EXTREMITY LEFT GAMAL   Final Result   1. No evidence of left lower extremity deep venous thrombosis. XR PELVIS (1-2 VIEWS)   Final Result   No acute process                       ------------------------- NURSING NOTES AND VITALS REVIEWED ---------------------------  Date / Time Roomed:  8/22/2019  8:22 AM  ED Bed Assignment:  12/12    The nursing notes within the ED encounter and vital signs as below have been reviewed.      Patient Vitals for the past 24 hrs:   BP Temp Temp src Pulse Resp SpO2 Weight   08/22/19 1243 130/71 -- -- 81 16 96 % --   08/22/19 1036 130/76 -- -- 78 18 97 % --   08/22/19 0824 (!) 146/87 97.2 °F (36.2 °C) Temporal 81 18 98 % 140 lb (63.5 kg)       Oxygen Saturation Interpretation: Normal    ------------------------------------------ PROGRESS NOTES ------------------------------------------  Re-evaluation(s):  Time: 12 : 30 pm  Patients symptoms show no change  Repeat physical examination is not changed    Counseling:  I have spoken with the patient and discussed todays

## 2019-08-23 ENCOUNTER — APPOINTMENT (OUTPATIENT)
Dept: GENERAL RADIOLOGY | Age: 84
DRG: 863 | End: 2019-08-23
Payer: MEDICARE

## 2019-08-23 LAB
ALBUMIN SERPL-MCNC: 2.7 G/DL (ref 3.5–5.2)
ALP BLD-CCNC: 169 U/L (ref 40–129)
ALT SERPL-CCNC: 11 U/L (ref 0–40)
ANION GAP SERPL CALCULATED.3IONS-SCNC: 11 MMOL/L (ref 7–16)
AST SERPL-CCNC: 20 U/L (ref 0–39)
BILIRUB SERPL-MCNC: 0.6 MG/DL (ref 0–1.2)
BUN BLDV-MCNC: 37 MG/DL (ref 8–23)
CALCIUM SERPL-MCNC: 8.4 MG/DL (ref 8.6–10.2)
CHLORIDE BLD-SCNC: 102 MMOL/L (ref 98–107)
CO2: 27 MMOL/L (ref 22–29)
CREAT SERPL-MCNC: 1.3 MG/DL (ref 0.7–1.2)
GFR AFRICAN AMERICAN: >60
GFR NON-AFRICAN AMERICAN: 52 ML/MIN/1.73
GLUCOSE BLD-MCNC: 107 MG/DL (ref 74–99)
HCT VFR BLD CALC: 35.6 % (ref 37–54)
HEMOGLOBIN: 11.5 G/DL (ref 12.5–16.5)
MCH RBC QN AUTO: 31.1 PG (ref 26–35)
MCHC RBC AUTO-ENTMCNC: 32.3 % (ref 32–34.5)
MCV RBC AUTO: 96.2 FL (ref 80–99.9)
PDW BLD-RTO: 15.9 FL (ref 11.5–15)
PLATELET # BLD: 415 E9/L (ref 130–450)
PMV BLD AUTO: 9.9 FL (ref 7–12)
POTASSIUM REFLEX MAGNESIUM: 3.8 MMOL/L (ref 3.5–5)
PREALBUMIN: 10 MG/DL (ref 20–40)
PRO-BNP: 2174 PG/ML (ref 0–450)
RBC # BLD: 3.7 E12/L (ref 3.8–5.8)
SODIUM BLD-SCNC: 140 MMOL/L (ref 132–146)
TOTAL PROTEIN: 5.8 G/DL (ref 6.4–8.3)
VANCOMYCIN RANDOM: 15.5 MCG/ML (ref 5–40)
WBC # BLD: 9.2 E9/L (ref 4.5–11.5)

## 2019-08-23 PROCEDURE — 84134 ASSAY OF PREALBUMIN: CPT

## 2019-08-23 PROCEDURE — 1200000000 HC SEMI PRIVATE

## 2019-08-23 PROCEDURE — 80053 COMPREHEN METABOLIC PANEL: CPT

## 2019-08-23 PROCEDURE — 6360000002 HC RX W HCPCS: Performed by: NURSE PRACTITIONER

## 2019-08-23 PROCEDURE — 2580000003 HC RX 258: Performed by: NURSE PRACTITIONER

## 2019-08-23 PROCEDURE — 6360000002 HC RX W HCPCS: Performed by: FAMILY MEDICINE

## 2019-08-23 PROCEDURE — 83880 ASSAY OF NATRIURETIC PEPTIDE: CPT

## 2019-08-23 PROCEDURE — 73562 X-RAY EXAM OF KNEE 3: CPT

## 2019-08-23 PROCEDURE — 2580000003 HC RX 258: Performed by: FAMILY MEDICINE

## 2019-08-23 PROCEDURE — 85027 COMPLETE CBC AUTOMATED: CPT

## 2019-08-23 PROCEDURE — 6370000000 HC RX 637 (ALT 250 FOR IP): Performed by: FAMILY MEDICINE

## 2019-08-23 PROCEDURE — 36415 COLL VENOUS BLD VENIPUNCTURE: CPT

## 2019-08-23 PROCEDURE — 80202 ASSAY OF VANCOMYCIN: CPT

## 2019-08-23 RX ORDER — PETROLATUM 42 G/100G
OINTMENT TOPICAL 2 TIMES DAILY PRN
Status: DISCONTINUED | OUTPATIENT
Start: 2019-08-23 | End: 2019-08-27

## 2019-08-23 RX ORDER — FUROSEMIDE 10 MG/ML
20 INJECTION INTRAMUSCULAR; INTRAVENOUS ONCE
Status: COMPLETED | OUTPATIENT
Start: 2019-08-23 | End: 2019-08-23

## 2019-08-23 RX ADMIN — HYDROCODONE BITARTRATE AND ACETAMINOPHEN 1 TABLET: 5; 325 TABLET ORAL at 22:13

## 2019-08-23 RX ADMIN — DOCUSATE SODIUM 100 MG: 100 CAPSULE, LIQUID FILLED ORAL at 10:07

## 2019-08-23 RX ADMIN — FUROSEMIDE 20 MG: 10 INJECTION, SOLUTION INTRAVENOUS at 10:07

## 2019-08-23 RX ADMIN — CEFEPIME HYDROCHLORIDE 1 G: 1 INJECTION, POWDER, FOR SOLUTION INTRAMUSCULAR; INTRAVENOUS at 03:58

## 2019-08-23 RX ADMIN — Medication 10 ML: at 10:07

## 2019-08-23 RX ADMIN — VANCOMYCIN HYDROCHLORIDE 1250 MG: 10 INJECTION, POWDER, LYOPHILIZED, FOR SOLUTION INTRAVENOUS at 10:07

## 2019-08-23 RX ADMIN — HEPARIN SODIUM 5000 UNITS: 10000 INJECTION INTRAVENOUS; SUBCUTANEOUS at 22:24

## 2019-08-23 ASSESSMENT — PAIN DESCRIPTION - LOCATION: LOCATION: HIP

## 2019-08-23 ASSESSMENT — PAIN - FUNCTIONAL ASSESSMENT
PAIN_FUNCTIONAL_ASSESSMENT: PREVENTS OR INTERFERES WITH MANY ACTIVE NOT PASSIVE ACTIVITIES
PAIN_FUNCTIONAL_ASSESSMENT: ACTIVITIES ARE NOT PREVENTED

## 2019-08-23 ASSESSMENT — PAIN DESCRIPTION - ORIENTATION: ORIENTATION: LEFT

## 2019-08-23 ASSESSMENT — PAIN DESCRIPTION - PROGRESSION: CLINICAL_PROGRESSION: GRADUALLY WORSENING

## 2019-08-23 ASSESSMENT — PAIN SCALES - GENERAL
PAINLEVEL_OUTOF10: 2
PAINLEVEL_OUTOF10: 0
PAINLEVEL_OUTOF10: 7

## 2019-08-23 ASSESSMENT — PAIN DESCRIPTION - FREQUENCY: FREQUENCY: INTERMITTENT

## 2019-08-23 ASSESSMENT — PAIN DESCRIPTION - PAIN TYPE: TYPE: ACUTE PAIN

## 2019-08-23 ASSESSMENT — PAIN SCALES - WONG BAKER
WONGBAKER_NUMERICALRESPONSE: 2
WONGBAKER_NUMERICALRESPONSE: 8
WONGBAKER_NUMERICALRESPONSE: 0

## 2019-08-23 ASSESSMENT — PAIN DESCRIPTION - ONSET: ONSET: ON-GOING

## 2019-08-23 ASSESSMENT — PAIN DESCRIPTION - DESCRIPTORS: DESCRIPTORS: SORE;ACHING;DISCOMFORT

## 2019-08-23 NOTE — PROGRESS NOTES
Nutrition Assessment    Type and Reason for Visit: Initial, Consult, Positive Nutrition Screen    Nutrition Recommendations: Recommend to continue Ensure Enlive supplement TID to help meet increased nutritional needs. Recommend and start Lefty wound healing supplement BID to help assist with proper wound healing. Nutrition Assessment: Patient meets criteria for moderate malnutrition AEB muscle and fat wasting ; Pt at further nutritional risk d/t increased needs from wound healing ; Will continue nutritional supplementation     Malnutrition Assessment:  · Malnutrition Status: Meets the criteria for moderate malnutrition  · Context: Chronic illness  · Findings of the 6 clinical characteristics of malnutrition (Minimum of 2 out of 6 clinical characteristics is required to make the diagnosis of moderate or severe Protein Calorie Malnutrition based on AND/ASPEN Guidelines):  1. Energy Intake-Less than or equal to 75% of estimated energy requirement, (x 2 days since admission )    2. Weight Loss-No significant weight loss,    3. Fat Loss-Mild subcutaneous fat loss, Orbital, Triceps  4. Muscle Loss-Moderate muscle mass loss, Temples (temporalis muscle), Clavicles (pectoralis and deltoids)  5. Fluid Accumulation-Mild fluid accumulation, Extremities  6.  Strength-Not measured    Nutrition Risk Level: Moderate    Nutrient Needs:  · Estimated Daily Total Kcal: 4428-7900 (REE 1320 x 1.3 SF)  · Estimated Daily Protein (g):  (1.3-1.5g/kg CBW)  · Estimated Daily Total Fluid (ml/day): 2161-1007    Nutrition Diagnosis:   · Problem:  Moderate malnutrition, In context of chronic illness  · Etiology: related to Insufficient energy/nutrient consumption     Signs and symptoms:  as evidenced by Diet history of poor intake, Moderate muscle loss, Mild loss of subcutaneous fat    Objective Information:  · Nutrition-Focused Physical Findings: I&Os WNL ; 1-3+ pitting edema ; active BS ; soft abd ; [place] ; confused at times ;

## 2019-08-23 NOTE — PROGRESS NOTES
Hospitalist Progress Note      Synopsis: Patient admitted on 2019 - left fem neck fracture s/p hemiarthroplasty - Dr Rome Vargas  19 - Discharged to [facility]  19 - OP ortho follow up - significant drainage, Doxycycline x 10 days  19 [facility]o office - swelling, increased drainage and pain  19 - admitted today for ortho evaluation    Subjective    Seen and examined  Confused this AM    Stable overnight. No other overnight issues reported. Denies chest pain, shortness of breath    Temp (24hrs), Av.3 °F (36.3 °C), Min:96.5 °F (35.8 °C), Max:97.8 °F (36.6 °C)    Exam:  BP (!) 99/53   Pulse 76   Temp 97.5 °F (36.4 °C) (Temporal)   Resp 16   Ht 5' 8\" (1.727 m)   Wt 152 lb (68.9 kg) Comment: bedscale per RD  SpO2 96%   BMI 23.11 kg/m²   General appearance: No apparent distress, appears stated age and cooperative. HEENT:  Conjunctivae/corneas clear. Neck: Supple. No jugular venous distention. Respiratory: Diminished breath sounds bilaterally, no respiratory distress  Cardiovascular: Regular rate rhythm without murmurs  Abdomen: Soft nontender, slightly distended  Musculoskeletal: No clubbing, cyanosis, 2+ edema up to his thighs bilaterally. Brisk capillary refill.    Skin:  No rashes  on visible skin  Neurologic: awake, alert and following commands     Medications:  Reviewed    Infusion Medications   Scheduled Medications    cefepime  2 g Intravenous Q12H    sodium chloride flush  10 mL Intravenous 2 times per day    heparin (porcine)  5,000 Units Subcutaneous Q8H    albumin human  25 g Intravenous Daily    docusate sodium  100 mg Oral Daily    vancomycin (VANCOCIN) intermittent dosing (placeholder)   Other RX Placeholder     PRN Meds: sodium chloride flush, magnesium hydroxide, ondansetron, acetaminophen, HYDROcodone-acetaminophen    I/O    Intake/Output Summary (Last 24 hours) at 2019 1407  Last data filed at 2019

## 2019-08-23 NOTE — DISCHARGE INSTR - COC
Continuity of Care Form    Patient Name: Papi Tello   :  1928  MRN:  [id number]    Admit date:  2019  Discharge date:  ***19    Code Status Order: DNR-CCA   Advance Directives:   Advance Care Flowsheet Documentation     Date/Time Healthcare Directive Type of Healthcare Directive Copy in [address] Agent's Name Healthcare Agent's Phone Number    [phone]  Yes, patient has an advance directive for healthcare treatment  Health care treatment directive;Durable power of  for health care  No, copy requested from [address] [place], [place] [address]e of  --  --          Admitting Physician:  Laurel Ledezma MD  PCP: Baldo Bird MD    Discharging Nurse: [facility] Unit/Room#: 9722/2366-E  Discharging Unit Phone Number: ***[phone]    Emergency Contact:   Extended Emergency Contact Information  Primary Emergency Contact: Danish Keane  Address: [address] [place] [address] Phone: [phone]  Relation: Child    Past Surgical History:  Past Surgical History:   Procedure Laterality Date    HEMIARTHROPLASTY HIP Left 2019    LEFT HIP HEMIARTHROPLASTY performed by Ever Harvey DO at [address] [place] [address] Right 7/3/2019    HIP OPEN REDUCTION INTERNAL FIXATION performed by Arben Salaamnca MD at [facility]         Immunization History: There is no immunization history on file for this patient. Active Problems:  Patient Active Problem List   Diagnosis Code    Closed fracture of right hip ([facility] [address] S72.001A    Closed displaced fracture of left femoral neck ([facility] [address] S72.002A    Closed fracture of left femur (HCC) S72. 92XA    Hypertension I10    CAD (coronary artery disease) I25.10    CKD (chronic kidney disease) N18.9    Postoperative surgical complication involving musculoskeletal system associated with musculoskeletal procedure M96.89    Cellulitis of left hip L03. 116    Anasarca R60.1    Moderate protein-calorie malnutrition (HCC) E44.0    Edema due to hypoalbuminemia R60.9, E88.09       Isolation/Infection:   Isolation          No Isolation            Nurse Assessment:  Last Vital Signs: /71   Pulse 71   Temp 97.5 °F (36.4 °C) (Temporal)   Resp 16   Ht 5' 8\" (1.727 m)   Wt 150 lb (68 kg)   SpO2 96%   BMI 22.81 kg/m²     Last documented pain score (0-10 scale): Pain Level: 0  Last Weight:   Wt Readings from Last 1 Encounters:   08/22/19 150 lb (68 kg)     Mental Status:  oriented, alert and coherent  DISORIENTED AT TIMES    IV Access:  - PICC - site  R Basilic, insertion date: 8/27/19     Nursing Mobility/ADLs:  Walking   Assisted  Transfer  Assisted  Bathing  Assisted  Dressing  Assisted  Toileting  Dependent  Feeding  Assisted  Med Admin  Assisted  Med Delivery   whole    Wound Care Documentation and Therapy:  Wound 08/22/19 Coccyx (Active)   Dressing Status Clean;Dry; Intact 8/23/2019 12:30 AM   Dressing Changed Changed/New 8/22/2019  2:40 PM   Dressing/Treatment Alginate;Dry Dressing 8/23/2019 12:30 AM   Wound Cleansed Rinsed/Irrigated with saline 8/22/2019  2:40 PM   Wound Length (cm) 2.6 cm 8/22/2019  2:40 PM   Wound Width (cm) 0.6 cm 8/22/2019  2:40 PM   Wound Depth (cm) 0.4 cm 8/22/2019  2:40 PM   Wound Surface Area (cm^2) 1.56 cm^2 8/22/2019  2:40 PM   Wound Volume (cm^3) 0.62 cm^3 8/22/2019  2:40 PM   Wound Assessment DIANA 8/23/2019 12:30 AM   Drainage Amount Scant 8/22/2019  4:40 PM   Odor None 8/22/2019  4:40 PM   Gin-wound Assessment Fragile 8/22/2019  4:40 PM   Number of days: 0        Elimination:  Continence:   · Bowel: No  · Bladder: No  Urinary Catheter: None   Colostomy/Ileostomy/Ileal Conduit: No       Date of Last BM:8/26/19    Intake/Output Summary (Last 24 hours) at 8/23/2019 8872  Last data filed at 8/22/2019 2243  Gross per 24 hour   Intake 390 ml   Output --   Net 390 ml     I/O last 3 completed shifts:   In: 390 [P.O.:240; IV Piggyback:150]  Out: -     Safety Concerns: At Risk for Falls    Impairments/Disabilities:      Hearing    Nutrition Therapy:  Current Nutrition Therapy:   - Oral Diet:  General  2 GM SODIUM  Routes of Feeding: Oral  Liquids: Thin Liquids  Daily Fluid Restriction: no  Last Modified Barium Swallow with Video (Video Swallowing Test): not done    Treatments at the Time of Hospital Discharge:     Respiratory Treatments: NO  Oxygen Therapy:  is not on home oxygen therapy. Ventilator:    - No ventilator support    Rehab Therapies: Physical Therapy and Occupational Therapy  Weight Bearing Status/Restrictions: No weight bearing restirctions  Other Medical Equipment (for information only, NOT a DME order):  walker  Other Treatments: ***AQUACEL TO COCCYX, HEEL PROTECTORS. KNEE HI WILLOW HOSE ON IN AM OFF IN PM.LEFT HIP WOUND CLEANSE DAILY AND APPLY GAUZE DRESSING ABD PAD AND 2IN TAPE CHANGE DAILY AND PRN FOR SATURATION BUTTOCKS AND COCCYX ARE VERY REDDENED BUT BLANCHABLE PT IS INCONTINENT OF BOWEL AND BLADDER. MOISTER BARRIER PAD PLACED BETWEEN LEGS AND OVER BOTTON SO PT WOULD NOT SOIL PJ BOTTOMS OR WHEELCHAIR DURING TRANSPORT. PT WAS CLEANSED AND HYDROPHOR APPLIED    Patient's personal belongings (please select all that are sent with patient):  {P DME Belongings:[id number]}    RN SIGNATURE:  {Esignature:[id number]}JESUS ALMANZA RN    CASE MANAGEMENT/SOCIAL WORK SECTION    Inpatient Status Date: ***    Readmission Risk Assessment Score:  Readmission Risk              Risk of Unplanned Readmission:        18           Discharging to Facility/ Agency   · Name: [facility]  · Address:  · Phone:  · Fax:    Dialysis Facility (if applicable)   · Name:  · Address:  · Dialysis Schedule:  · Phone:  · Fax:    / signature: Electronically signed by LAUREN Richard on 8/23/2019 at 11:04 AM      PHYSICIAN SECTION    Prognosis: Guarded    Condition at Discharge: Stable    Rehab Potential (if transferring to Rehab): Fair    Recommended Labs or Other Treatments After Discharge: CBC / CMP in 1 week    Physician Certification: I certify the above information and transfer of Nino Lora  is necessary for the continuing treatment of the diagnosis listed and that he requires [facility] for less 30 days.      Update Admission H&P: No change in H&P    PHYSICIAN SIGNATURE:  Electronically signed by Steve Mishra MD on 8/27/19 at 1:45 PM

## 2019-08-24 LAB
ALBUMIN SERPL-MCNC: 3 G/DL (ref 3.5–5.2)
ALP BLD-CCNC: 144 U/L (ref 40–129)
ALT SERPL-CCNC: 10 U/L (ref 0–40)
ANION GAP SERPL CALCULATED.3IONS-SCNC: 13 MMOL/L (ref 7–16)
AST SERPL-CCNC: 19 U/L (ref 0–39)
BILIRUB SERPL-MCNC: 0.5 MG/DL (ref 0–1.2)
BUN BLDV-MCNC: 36 MG/DL (ref 8–23)
CALCIUM SERPL-MCNC: 8.2 MG/DL (ref 8.6–10.2)
CHLORIDE BLD-SCNC: 102 MMOL/L (ref 98–107)
CO2: 26 MMOL/L (ref 22–29)
CREAT SERPL-MCNC: 1.3 MG/DL (ref 0.7–1.2)
GFR AFRICAN AMERICAN: >60
GFR NON-AFRICAN AMERICAN: 52 ML/MIN/1.73
GLUCOSE BLD-MCNC: 122 MG/DL (ref 74–99)
POTASSIUM REFLEX MAGNESIUM: 3.7 MMOL/L (ref 3.5–5)
SODIUM BLD-SCNC: 141 MMOL/L (ref 132–146)
TOTAL PROTEIN: 5.4 G/DL (ref 6.4–8.3)
VANCOMYCIN RANDOM: 16.4 MCG/ML (ref 5–40)

## 2019-08-24 PROCEDURE — 97166 OT EVAL MOD COMPLEX 45 MIN: CPT

## 2019-08-24 PROCEDURE — 2580000003 HC RX 258

## 2019-08-24 PROCEDURE — 97530 THERAPEUTIC ACTIVITIES: CPT

## 2019-08-24 PROCEDURE — 80053 COMPREHEN METABOLIC PANEL: CPT

## 2019-08-24 PROCEDURE — 97161 PT EVAL LOW COMPLEX 20 MIN: CPT

## 2019-08-24 PROCEDURE — P9047 ALBUMIN (HUMAN), 25%, 50ML: HCPCS | Performed by: FAMILY MEDICINE

## 2019-08-24 PROCEDURE — 80202 ASSAY OF VANCOMYCIN: CPT

## 2019-08-24 PROCEDURE — 36415 COLL VENOUS BLD VENIPUNCTURE: CPT

## 2019-08-24 PROCEDURE — 2580000003 HC RX 258: Performed by: NURSE PRACTITIONER

## 2019-08-24 PROCEDURE — 2580000003 HC RX 258: Performed by: FAMILY MEDICINE

## 2019-08-24 PROCEDURE — 6360000002 HC RX W HCPCS

## 2019-08-24 PROCEDURE — 6360000002 HC RX W HCPCS: Performed by: SPECIALIST

## 2019-08-24 PROCEDURE — 6370000000 HC RX 637 (ALT 250 FOR IP): Performed by: FAMILY MEDICINE

## 2019-08-24 PROCEDURE — 2580000003 HC RX 258: Performed by: SPECIALIST

## 2019-08-24 PROCEDURE — 6360000002 HC RX W HCPCS: Performed by: FAMILY MEDICINE

## 2019-08-24 PROCEDURE — 1200000000 HC SEMI PRIVATE

## 2019-08-24 PROCEDURE — 6360000002 HC RX W HCPCS: Performed by: NURSE PRACTITIONER

## 2019-08-24 RX ORDER — SODIUM CHLORIDE 9 MG/ML
INJECTION, SOLUTION INTRAVENOUS CONTINUOUS
Status: DISCONTINUED | OUTPATIENT
Start: 2019-08-24 | End: 2019-08-27 | Stop reason: HOSPADM

## 2019-08-24 RX ADMIN — CEFEPIME HYDROCHLORIDE 2 G: 2 INJECTION, POWDER, FOR SOLUTION INTRAVENOUS at 16:16

## 2019-08-24 RX ADMIN — VANCOMYCIN HYDROCHLORIDE 1250 MG: 10 INJECTION, POWDER, LYOPHILIZED, FOR SOLUTION INTRAVENOUS at 09:56

## 2019-08-24 RX ADMIN — ALBUMIN (HUMAN) 25 G: 0.25 INJECTION, SOLUTION INTRAVENOUS at 15:53

## 2019-08-24 RX ADMIN — CEFEPIME HYDROCHLORIDE 2 G: 2 INJECTION, POWDER, FOR SOLUTION INTRAVENOUS at 03:16

## 2019-08-24 RX ADMIN — Medication 10 ML: at 09:56

## 2019-08-24 RX ADMIN — SODIUM CHLORIDE: 9 INJECTION, SOLUTION INTRAVENOUS at 12:13

## 2019-08-24 RX ADMIN — HYDROCODONE BITARTRATE AND ACETAMINOPHEN 1 TABLET: 5; 325 TABLET ORAL at 21:32

## 2019-08-24 RX ADMIN — HEPARIN SODIUM 5000 UNITS: 10000 INJECTION INTRAVENOUS; SUBCUTANEOUS at 21:33

## 2019-08-24 RX ADMIN — ALBUMIN (HUMAN) 25 G: 0.25 INJECTION, SOLUTION INTRAVENOUS at 02:37

## 2019-08-24 RX ADMIN — HEPARIN SODIUM 5000 UNITS: 10000 INJECTION INTRAVENOUS; SUBCUTANEOUS at 06:40

## 2019-08-24 ASSESSMENT — PAIN DESCRIPTION - ORIENTATION: ORIENTATION: LEFT

## 2019-08-24 ASSESSMENT — PAIN DESCRIPTION - PROGRESSION: CLINICAL_PROGRESSION: NOT CHANGED

## 2019-08-24 ASSESSMENT — PAIN SCALES - GENERAL
PAINLEVEL_OUTOF10: 9
PAINLEVEL_OUTOF10: 6
PAINLEVEL_OUTOF10: 0

## 2019-08-24 ASSESSMENT — PAIN DESCRIPTION - ONSET: ONSET: ON-GOING

## 2019-08-24 ASSESSMENT — PAIN - FUNCTIONAL ASSESSMENT: PAIN_FUNCTIONAL_ASSESSMENT: PREVENTS OR INTERFERES SOME ACTIVE ACTIVITIES AND ADLS

## 2019-08-24 ASSESSMENT — PAIN DESCRIPTION - FREQUENCY: FREQUENCY: INTERMITTENT

## 2019-08-24 ASSESSMENT — PAIN DESCRIPTION - PAIN TYPE: TYPE: ACUTE PAIN

## 2019-08-24 ASSESSMENT — PAIN DESCRIPTION - DESCRIPTORS: DESCRIPTORS: DISCOMFORT;SORE;ACHING

## 2019-08-24 ASSESSMENT — PAIN DESCRIPTION - LOCATION: LOCATION: HIP

## 2019-08-24 NOTE — PROGRESS NOTES
OCCUPATIONAL THERAPY INITIAL EVALUATION      Date:2019  Patient Name: Sheri García  MRN: [id number]  : 1928  Room: [address] [place] [address]      Evaluating OT: Dick Kim OTR/L #[id number]    AM-PAC Daily Activity Raw Score: 15/24    Recommended Adaptive Equipment: To be further assessed      Diagnosis:    1. Pain of left hip joint    2. Status post hip surgery    3. Cellulitis of left lower extremity          Pertinent Medical History: 19 R hip ORIF, fall  required L hip arthroplasty ,      Precautions:  Falls, bed alarm, confusion, L hip precautions, WBAT BLE     Home Living: Pt is currently from a [facility] for rehab of his L Hip. Pt lived with daughter in 2.5 floor home. Unable to recall # LING  Per chart, 4+4 stairs top level, 1 handrail ? Prior Level of Function: questionable historian, in NH, assistance  with ADLs , assistance  with IADLs; using ww for ambulation. Or w/c for mobility in NH  D    Pain Level: 3/10 L hip,  nursing is aware  Cognition: A&O: self, month and hospital, confused to year. And general 1-2 step directions   Memory:  poor+   Sequencing:  poor   Problem solving:  poor   Judgement/safety:  poor     Functional Assessment:   Initial Eval Status  Date: 19 Treatment Status  Date: Short Term Goals  Treatment frequency: PRN    Feeding Stand by Assist   Modified [place]    Grooming Minimal Assist   Modified [place]    UB Dressing Minimal Assist   Modified [place]    LB Dressing Maximal Assist   Minimal Assist    Bathing Maximal Assist  Minimal Assist    Toileting Maximal Assist   Minimal Assist    Bed Mobility  Supine to sit: Maximal Assist   Sit to supine: Maximal Assist   Supine to sit: Minimal Assist   Sit to supine: Minimal Assist    Functional Transfers Sit to stand: Maximal Assist   Stand to sit: Maximal Assist   Stand pivot: Maximal Assist   Minimal Assist    Functional Mobility Max A with ww 2-3 side steps and approx 10 feet with ww. And max cues.   Min A

## 2019-08-24 NOTE — PROGRESS NOTES
Physical Therapy  Initial Assessment     Name: Adama Jain  : 1928  MRN: [id number]    Date of Service: 2019    Evaluating PT:  Yunior Rowe, PT, DPT    Room #:  5341/7353-N  Diagnosis:  Postop surgical complication   Reason for admission: L hip pain and drainage   Precautions:  Falls, WBAT LLE  Procedures: none this admission - prior,  R hip ORIF,  L femoral neck fx with hemiarthroplasty   Equipment recommendations:  [employer]    Pt is a poor historian. Admitted from [facility]. Active with therapy? Ambulating short distances with [employer].  Prior,  Pt lives with daughter in an apartment with 4 steps to enter. 8 steps inside to living area. Ambulating with [employer] and wheelchair? Initial Evaluation  Date:  Treatment Short Term/ Long Term   Goals   AM-PAC 6 Clicks      Was pt agreeable to Eval/treatment? Yes      Does pt have pain? 4/10 L knee      Bed Mobility  Rolling: NT  Supine to sit: MaxA  Sit to supine: MaxA  Scooting: MaxA  Humberto   Transfers Sit to stand: MaxA  Stand to sit: ModA  Stand pivot: NT  Humberto   Ambulation    10 ft with [employer] MaxA    >50 ft with [employer]   Stair negotiation: ascended and descended  NT  NA   ROM BUE:  See OT eval  BLE:  WFL     Strength BUE:  See OT eval  BLE grossly:  3/5  4/5   Balance Sitting EOB:  SBA  Dynamic Standing:  MaxA  Sitting EOB:  independent  Dynamic Standing:  Humberto   Endurance  Fair   Good      -Pt is A & O to self, hospital, and month. Not year.   -Sensation:  Pt denies numbness and tingling to extremities  -Edema:  Anasarca     Patient education  Pt educated on safety, sequencing during transfers, hip precautions, and role of PT     Patient response to education:   Pt verbalized understanding Pt demonstrated skill Pt requires further education in this area   Yes  No  Yes      Assessment/Comments  ---Pt received supine in bed and was agreeable to session with OT collaboration. RN reported pt stable for participation prior to session.  Pt alert but mildly

## 2019-08-25 LAB
ALBUMIN SERPL-MCNC: 2.8 G/DL (ref 3.5–5.2)
ALP BLD-CCNC: 132 U/L (ref 40–129)
ALT SERPL-CCNC: 12 U/L (ref 0–40)
ANION GAP SERPL CALCULATED.3IONS-SCNC: 8 MMOL/L (ref 7–16)
AST SERPL-CCNC: 28 U/L (ref 0–39)
BILIRUB SERPL-MCNC: 0.6 MG/DL (ref 0–1.2)
BUN BLDV-MCNC: 36 MG/DL (ref 8–23)
CALCIUM SERPL-MCNC: 8.5 MG/DL (ref 8.6–10.2)
CHLORIDE BLD-SCNC: 103 MMOL/L (ref 98–107)
CO2: 26 MMOL/L (ref 22–29)
CREAT SERPL-MCNC: 1.2 MG/DL (ref 0.7–1.2)
GFR AFRICAN AMERICAN: >60
GFR NON-AFRICAN AMERICAN: 57 ML/MIN/1.73
GLUCOSE BLD-MCNC: 82 MG/DL (ref 74–99)
POTASSIUM REFLEX MAGNESIUM: 4.9 MMOL/L (ref 3.5–5)
SODIUM BLD-SCNC: 137 MMOL/L (ref 132–146)
TOTAL PROTEIN: 5.6 G/DL (ref 6.4–8.3)

## 2019-08-25 PROCEDURE — 6370000000 HC RX 637 (ALT 250 FOR IP): Performed by: FAMILY MEDICINE

## 2019-08-25 PROCEDURE — 6360000002 HC RX W HCPCS: Performed by: NURSE PRACTITIONER

## 2019-08-25 PROCEDURE — 36415 COLL VENOUS BLD VENIPUNCTURE: CPT

## 2019-08-25 PROCEDURE — 6360000002 HC RX W HCPCS: Performed by: SPECIALIST

## 2019-08-25 PROCEDURE — 80053 COMPREHEN METABOLIC PANEL: CPT

## 2019-08-25 PROCEDURE — 2580000003 HC RX 258

## 2019-08-25 PROCEDURE — 6360000002 HC RX W HCPCS

## 2019-08-25 PROCEDURE — 2580000003 HC RX 258: Performed by: SPECIALIST

## 2019-08-25 PROCEDURE — 1200000000 HC SEMI PRIVATE

## 2019-08-25 RX ORDER — LACTOSE-REDUCED FOOD
1 LIQUID (ML) ORAL
Status: DISCONTINUED | OUTPATIENT
Start: 2019-08-25 | End: 2019-08-25 | Stop reason: CLARIF

## 2019-08-25 RX ADMIN — HEPARIN SODIUM 5000 UNITS: 10000 INJECTION INTRAVENOUS; SUBCUTANEOUS at 05:44

## 2019-08-25 RX ADMIN — CEFEPIME HYDROCHLORIDE 2 G: 2 INJECTION, POWDER, FOR SOLUTION INTRAVENOUS at 16:21

## 2019-08-25 RX ADMIN — HYDROCODONE BITARTRATE AND ACETAMINOPHEN 1 TABLET: 5; 325 TABLET ORAL at 05:43

## 2019-08-25 RX ADMIN — CEFEPIME HYDROCHLORIDE 2 G: 2 INJECTION, POWDER, FOR SOLUTION INTRAVENOUS at 04:10

## 2019-08-25 RX ADMIN — VANCOMYCIN HYDROCHLORIDE 1250 MG: 10 INJECTION, POWDER, LYOPHILIZED, FOR SOLUTION INTRAVENOUS at 11:40

## 2019-08-25 RX ADMIN — HEPARIN SODIUM 5000 UNITS: 10000 INJECTION INTRAVENOUS; SUBCUTANEOUS at 21:26

## 2019-08-25 RX ADMIN — HEPARIN SODIUM 5000 UNITS: 10000 INJECTION INTRAVENOUS; SUBCUTANEOUS at 14:21

## 2019-08-25 RX ADMIN — DOCUSATE SODIUM 100 MG: 100 CAPSULE, LIQUID FILLED ORAL at 11:40

## 2019-08-25 RX ADMIN — HYDROCODONE BITARTRATE AND ACETAMINOPHEN 1 TABLET: 5; 325 TABLET ORAL at 11:43

## 2019-08-25 ASSESSMENT — PAIN SCALES - GENERAL
PAINLEVEL_OUTOF10: 0
PAINLEVEL_OUTOF10: 7
PAINLEVEL_OUTOF10: 6
PAINLEVEL_OUTOF10: 0
PAINLEVEL_OUTOF10: 0
PAINLEVEL_OUTOF10: 4

## 2019-08-25 ASSESSMENT — PAIN DESCRIPTION - FREQUENCY: FREQUENCY: INTERMITTENT

## 2019-08-25 ASSESSMENT — PAIN DESCRIPTION - PAIN TYPE: TYPE: ACUTE PAIN

## 2019-08-25 ASSESSMENT — PAIN DESCRIPTION - DESCRIPTORS: DESCRIPTORS: ACHING;DISCOMFORT

## 2019-08-25 ASSESSMENT — PAIN DESCRIPTION - LOCATION: LOCATION: HIP

## 2019-08-25 ASSESSMENT — PAIN DESCRIPTION - ONSET: ONSET: ON-GOING

## 2019-08-25 ASSESSMENT — PAIN DESCRIPTION - ORIENTATION: ORIENTATION: LEFT

## 2019-08-25 NOTE — PROGRESS NOTES
Hospitalist Progress Note      Synopsis: Patient admitted on 2019 - left fem neck fracture s/p hemiarthroplasty - Dr Fernie Dominguez  19 - Discharged to [facility]  19 - OP ortho follow up - significant drainage, Doxycycline x 10 days  19 - Wound care RN from [facility] > calls Ortho office - swelling, increased drainage and pain  19 - admitted today for ortho evaluation    Subjective    Patient seen and examined  Appears to be slightly well this morning  No complaints otherwise    STILL NO WILLOW HOSE -I have literally requested everyday from both the Charge and RNs involved in his case    Denies chest pain, shortness of breath    Temp (24hrs), Av °F (36.7 °C), Min:97.6 °F (36.4 °C), Max:98.2 °F (36.8 °C)    Exam:  /70   Pulse 75   Temp 98.2 °F (36.8 °C) (Temporal)   Resp 16   Ht 5' 8\" (1.727 m)   Wt 152 lb (68.9 kg) Comment: bedscale per RD  SpO2 100%   BMI 23.11 kg/m²   General appearance: No apparent distress, appears stated age and cooperative. HEENT:  Conjunctivae/corneas clear. Neck: Supple. No jugular venous distention. Respiratory: Diminished breath sounds bilaterally, no respiratory distress  Cardiovascular: Regular rate rhythm without murmurs  Abdomen: Soft nontender, slightly distended  Musculoskeletal: No clubbing, cyanosis, 2+ edema up to his thighs bilaterally. Brisk capillary refill.    Skin:  No rashes  on visible skin  Neurologic: awake, alert and following commands     Medications:  Reviewed    Infusion Medications    sodium chloride 75 mL/hr at 19 1213     Scheduled Medications    ENSURE PLUS  1 Can Oral TID WC    vancomycin  1,250 mg Intravenous Q24H    cefepime  2 g Intravenous Q12H    sodium chloride flush  10 mL Intravenous 2 times per day    heparin (porcine)  5,000 Units Subcutaneous Q8H    docusate sodium  100 mg Oral Daily     PRN Meds: mineral oil-hydrophilic petrolatum, sodium chloride flush, magnesium hydroxide, ondansetron, Supplement  DIET GENERAL; Low Sodium (2 GM)  Code Status: DNR-CCA  Disposition: TBD    +++++++++++++++++++++++++++++++++++++++++++++++++  Merline Cords, MD   [address]  +++++++++++++++++++++++++++++++++++++++++++++++++  NOTE: This report was transcribed using voice recognition software. Every effort was made to ensure accuracy; however, inadvertent computerized transcription errors may be present.

## 2019-08-25 NOTE — PROGRESS NOTES
[address] [place] [address] [facility]  Progress Note    C/C ; Left hip wound infection       Pt is awake and alert  Reports pain in the left hip   Denies fever and chills  Afebrile       Current Facility-Administered Medications   Medication Dose Route Frequency Provider Last Rate Last Dose    vancomycin (VANCOCIN) 1,250 mg in dextrose 5 % 250 mL IVPB  1,250 mg Intravenous Q24H Constantine Skinner RPH   Stopped at 08/24/19 1202    0.9 % sodium chloride infusion   Intravenous Continuous Edgar Jay MD 75 mL/hr at 08/24/19 1213      cefepime (MAXIPIME) 2 g IVPB extended (mini-bag)  2 g Intravenous Q12H Rosemarie Alves MD   Stopped at 08/25/19 0810    mineral oil-hydrophilic petrolatum (HYDROPHOR) ointment   Topical BID PRN Wesley Flores MD        sodium chloride flush 0.9 % injection 10 mL  10 mL Intravenous 2 times per day REBECCA Smyth CNP   10 mL at 08/24/19 0956    sodium chloride flush 0.9 % injection 10 mL  10 mL Intravenous PRN REBECCA Smyth CNP        magnesium hydroxide (MILK OF MAGNESIA) 400 MG/5ML suspension 30 mL  30 mL Oral Daily PRN REBECCA Smyth CNP        ondansetron (ZOFRAN) injection 4 mg  4 mg Intravenous Q6H PRN REBECCA Smyth CNP        heparin (porcine) injection 5,000 Units  5,000 Units Subcutaneous Q8H REBECCA Smyth CNP   5,000 Units at 08/25/19 0544    acetaminophen (TYLENOL) tablet 650 mg  650 mg Oral Q4H PRN REBECCA Smyth CNP        HYDROcodone-acetaminophen (NORCO) 5-325 MG per tablet 1 tablet  1 tablet Oral Q6H PRN Edgar Jay MD   1 tablet at 08/25/19 0543    docusate sodium (COLACE) capsule 100 mg  100 mg Oral Daily Edgar Jay MD   100 mg at 08/23/19 1007     REVIEW OF SYSTEMS:      CONSTITUTIONAL:  Denies fever, chill or rigors, nausea or vomiting.   HEENT: denies blurring of vision or double vision, denies hearing problem  RESPIRATORY: denies cough, shortness of breath, sputum 1.2 08/25/2019    CREATININE 1.3 08/24/2019    CREATININE 1.3 08/23/2019    GFRAA >60 08/25/2019    LABGLOM 57 08/25/2019    GLUCOSE 82 08/25/2019    PROT 5.6 08/25/2019    LABALBU 2.8 08/25/2019    CALCIUM 8.5 08/25/2019    BILITOT 0.6 08/25/2019    ALKPHOS 132 08/25/2019    AST 28 08/25/2019    ALT 12 08/25/2019     Lab Results   Component Value Date    CRP 3.2 (H) 08/22/2019     Lab Results   Component Value Date    SEDRATE 15 08/22/2019     Radiology:      Microbiology:     Mixed vito isolated. Further workup and sensitivity testing   is not routinely indicated and will not be performed.    Mixed vito isolated includes:   Oxidase positive Gram negative rods   Corynebacteria   Coagulase negative Staph species   Gram negative rods      Narrative:       Sed rate 15 , CRP - 3.3        ASSESSMENT:  · Left hip surgical wound infection possibly arthroplasty is involved    PLAN:  · Continue Vancomycin 1250 mg IV q 24 hrs  and cefepime 2 grams IV q 12 hrs   · Vancomycin trough level   · Monitor labs    Neal P Limbu  10:09 AM  8/25/2019

## 2019-08-26 LAB
ALBUMIN SERPL-MCNC: 3 G/DL (ref 3.5–5.2)
ALP BLD-CCNC: 149 U/L (ref 40–129)
ALT SERPL-CCNC: 10 U/L (ref 0–40)
ANION GAP SERPL CALCULATED.3IONS-SCNC: 12 MMOL/L (ref 7–16)
AST SERPL-CCNC: 19 U/L (ref 0–39)
BILIRUB SERPL-MCNC: 0.7 MG/DL (ref 0–1.2)
BUN BLDV-MCNC: 34 MG/DL (ref 8–23)
CALCIUM SERPL-MCNC: 8.2 MG/DL (ref 8.6–10.2)
CHLORIDE BLD-SCNC: 102 MMOL/L (ref 98–107)
CO2: 26 MMOL/L (ref 22–29)
CREAT SERPL-MCNC: 1.2 MG/DL (ref 0.7–1.2)
GFR AFRICAN AMERICAN: >60
GFR NON-AFRICAN AMERICAN: 57 ML/MIN/1.73
GLUCOSE BLD-MCNC: 98 MG/DL (ref 74–99)
POTASSIUM REFLEX MAGNESIUM: 3.7 MMOL/L (ref 3.5–5)
SODIUM BLD-SCNC: 140 MMOL/L (ref 132–146)
TOTAL PROTEIN: 5.7 G/DL (ref 6.4–8.3)
VANCOMYCIN TROUGH: 22.3 MCG/ML (ref 5–16)

## 2019-08-26 PROCEDURE — 6360000002 HC RX W HCPCS: Performed by: NURSE PRACTITIONER

## 2019-08-26 PROCEDURE — 2580000003 HC RX 258: Performed by: FAMILY MEDICINE

## 2019-08-26 PROCEDURE — 6370000000 HC RX 637 (ALT 250 FOR IP): Performed by: SPECIALIST

## 2019-08-26 PROCEDURE — 6370000000 HC RX 637 (ALT 250 FOR IP): Performed by: FAMILY MEDICINE

## 2019-08-26 PROCEDURE — 36415 COLL VENOUS BLD VENIPUNCTURE: CPT

## 2019-08-26 PROCEDURE — 80202 ASSAY OF VANCOMYCIN: CPT

## 2019-08-26 PROCEDURE — 1200000000 HC SEMI PRIVATE

## 2019-08-26 PROCEDURE — 80053 COMPREHEN METABOLIC PANEL: CPT

## 2019-08-26 PROCEDURE — 6370000000 HC RX 637 (ALT 250 FOR IP): Performed by: NURSE PRACTITIONER

## 2019-08-26 PROCEDURE — 6360000002 HC RX W HCPCS: Performed by: SPECIALIST

## 2019-08-26 PROCEDURE — 2580000003 HC RX 258: Performed by: SPECIALIST

## 2019-08-26 RX ORDER — LEVOFLOXACIN 500 MG/1
500 TABLET, FILM COATED ORAL DAILY
Status: DISCONTINUED | OUTPATIENT
Start: 2019-08-26 | End: 2019-08-27 | Stop reason: HOSPADM

## 2019-08-26 RX ORDER — SODIUM CHLORIDE 0.9 % (FLUSH) 0.9 %
10 SYRINGE (ML) INJECTION PRN
Status: DISCONTINUED | OUTPATIENT
Start: 2019-08-26 | End: 2019-08-27 | Stop reason: HOSPADM

## 2019-08-26 RX ORDER — SODIUM CHLORIDE 0.9 % (FLUSH) 0.9 %
10 SYRINGE (ML) INJECTION EVERY 12 HOURS SCHEDULED
Status: DISCONTINUED | OUTPATIENT
Start: 2019-08-26 | End: 2019-08-27 | Stop reason: HOSPADM

## 2019-08-26 RX ORDER — LIDOCAINE HYDROCHLORIDE 10 MG/ML
5 INJECTION, SOLUTION EPIDURAL; INFILTRATION; INTRACAUDAL; PERINEURAL ONCE
Status: DISCONTINUED | OUTPATIENT
Start: 2019-08-26 | End: 2019-08-27 | Stop reason: HOSPADM

## 2019-08-26 RX ORDER — LEVOFLOXACIN 500 MG/1
500 TABLET, FILM COATED ORAL DAILY
Qty: 24 TABLET | Refills: 0 | Status: ON HOLD | OUTPATIENT
Start: 2019-08-26 | End: 2019-09-23 | Stop reason: HOSPADM

## 2019-08-26 RX ADMIN — SODIUM CHLORIDE: 9 INJECTION, SOLUTION INTRAVENOUS at 19:30

## 2019-08-26 RX ADMIN — Medication 10 ML: at 20:55

## 2019-08-26 RX ADMIN — LEVOFLOXACIN 500 MG: 500 TABLET, FILM COATED ORAL at 13:37

## 2019-08-26 RX ADMIN — HEPARIN SODIUM 5000 UNITS: 10000 INJECTION INTRAVENOUS; SUBCUTANEOUS at 06:22

## 2019-08-26 RX ADMIN — HEPARIN SODIUM 5000 UNITS: 10000 INJECTION INTRAVENOUS; SUBCUTANEOUS at 21:54

## 2019-08-26 RX ADMIN — DOCUSATE SODIUM 100 MG: 100 CAPSULE, LIQUID FILLED ORAL at 08:51

## 2019-08-26 RX ADMIN — HEPARIN SODIUM 5000 UNITS: 10000 INJECTION INTRAVENOUS; SUBCUTANEOUS at 14:36

## 2019-08-26 RX ADMIN — ACETAMINOPHEN 650 MG: 325 TABLET, FILM COATED ORAL at 19:31

## 2019-08-26 RX ADMIN — CEFEPIME HYDROCHLORIDE 2 G: 2 INJECTION, POWDER, FOR SOLUTION INTRAVENOUS at 04:03

## 2019-08-26 ASSESSMENT — PAIN DESCRIPTION - DESCRIPTORS: DESCRIPTORS: ACHING;CONSTANT

## 2019-08-26 ASSESSMENT — PAIN SCALES - GENERAL
PAINLEVEL_OUTOF10: 4
PAINLEVEL_OUTOF10: 0
PAINLEVEL_OUTOF10: 1

## 2019-08-26 ASSESSMENT — PAIN DESCRIPTION - PAIN TYPE: TYPE: ACUTE PAIN

## 2019-08-26 ASSESSMENT — PAIN DESCRIPTION - ORIENTATION: ORIENTATION: LEFT

## 2019-08-26 ASSESSMENT — PAIN DESCRIPTION - LOCATION: LOCATION: HIP

## 2019-08-26 NOTE — PROGRESS NOTES
dose/monitor vancomycin  · Goal trough level: 15-20 mcg/mL   · Trough of 22.3 mcg/mL at 1043 (~23 hr post dose) on 8/26 prior to the 4th dose of vancomycin 1250 mg IV every 24 hours  · Scr 1.2 today (1.4 on presentation), most recently Scr was 1.4-1.7 in 7/2019 per review of Epic     Plan:  · Decrease to vancomycin 1000 mg IV every 24 hours  · Vancomycin trough at new steady state  · Pharmacist will follow and monitor/adjust dosing as necessary    Macon Callow, PharmD, [facility] 8/26/2019 12:32 PM    Addendum: Per Infectious Diseases, start vancomycin 1000 mg IV every 24 hours tomorrow. Dose adjusted to start tomorrow.      Macon Callow, PharmD, BCPS 8/26/2019 12:43 PM

## 2019-08-26 NOTE — PROGRESS NOTES
Hospitalist Progress Note      Synopsis: Patient admitted on 2019 - left fem neck fracture s/p hemiarthroplasty - Dr Friedman Never  19 - Discharged to [facility]  19 - OP ortho follow up - significant drainage, Doxycycline x 10 days  19 - Wound care RN from [facility] > calls Ortho office - swelling, increased drainage and pain  19 - admitted today for ortho evaluation    Subjective    Patient seen and examined  No surgical interventions planned    Denies chest pain, shortness of breath    Temp (24hrs), Av.4 °F (36.9 °C), Min:97.3 °F (36.3 °C), Max:99 °F (37.2 °C)    Exam:  /60   Pulse 72   Temp 97.3 °F (36.3 °C) (Temporal)   Resp 16   Ht 5' 8\" (1.727 m)   Wt 152 lb (68.9 kg) Comment: bedscale per RD  SpO2 95%   BMI 23.11 kg/m²   General appearance: No apparent distress, appears stated age and cooperative. HEENT:  Conjunctivae/corneas clear. Neck: Supple. No jugular venous distention. Respiratory: Diminished breath sounds bilaterally, no respiratory distress  Cardiovascular: Regular rate rhythm without murmurs  Abdomen: Soft nontender, slightly distended  Musculoskeletal: No clubbing, cyanosis, 2+ edema up to his thighs bilaterally. Brisk capillary refill.    Skin:  No rashes  on visible skin  Neurologic: awake, alert and following commands     Medications:  Reviewed    Infusion Medications    sodium chloride 75 mL/hr at 19 1213     Scheduled Medications    [START ON 2019] vancomycin  1,000 mg Intravenous Q24H    levofloxacin  500 mg Oral Daily    lidocaine 1 % injection  5 mL Intradermal Once    sodium chloride flush  10 mL Intravenous 2 times per day    sodium chloride flush  10 mL Intravenous 2 times per day    heparin (porcine)  5,000 Units Subcutaneous Q8H    docusate sodium  100 mg Oral Daily     PRN Meds: sodium chloride flush, mineral oil-hydrophilic petrolatum, sodium chloride flush, magnesium hydroxide, ondansetron, acetaminophen, bed hold    +++++++++++++++++++++++++++++++++++++++++++++++++  Shobha Valdez MD   [address]  +++++++++++++++++++++++++++++++++++++++++++++++++  NOTE: This report was transcribed using voice recognition software. Every effort was made to ensure accuracy; however, inadvertent computerized transcription errors may be present.

## 2019-08-27 ENCOUNTER — APPOINTMENT (OUTPATIENT)
Dept: GENERAL RADIOLOGY | Age: 84
DRG: 863 | End: 2019-08-27
Payer: MEDICARE

## 2019-08-27 VITALS
DIASTOLIC BLOOD PRESSURE: 71 MMHG | OXYGEN SATURATION: 97 % | SYSTOLIC BLOOD PRESSURE: 139 MMHG | WEIGHT: 152 LBS | BODY MASS INDEX: 23.04 KG/M2 | TEMPERATURE: 97.9 F | HEART RATE: 81 BPM | HEIGHT: 68 IN | RESPIRATION RATE: 18 BRPM

## 2019-08-27 LAB
ALBUMIN SERPL-MCNC: 3 G/DL (ref 3.5–5.2)
ALP BLD-CCNC: 180 U/L (ref 40–129)
ALT SERPL-CCNC: 11 U/L (ref 0–40)
ANION GAP SERPL CALCULATED.3IONS-SCNC: 11 MMOL/L (ref 7–16)
AST SERPL-CCNC: 18 U/L (ref 0–39)
BILIRUB SERPL-MCNC: 0.5 MG/DL (ref 0–1.2)
BLOOD CULTURE, ROUTINE: NORMAL
BUN BLDV-MCNC: 36 MG/DL (ref 8–23)
CALCIUM SERPL-MCNC: 8.5 MG/DL (ref 8.6–10.2)
CHLORIDE BLD-SCNC: 106 MMOL/L (ref 98–107)
CO2: 25 MMOL/L (ref 22–29)
CREAT SERPL-MCNC: 1.1 MG/DL (ref 0.7–1.2)
CULTURE, BLOOD 2: NORMAL
GFR AFRICAN AMERICAN: >60
GFR NON-AFRICAN AMERICAN: >60 ML/MIN/1.73
GLUCOSE BLD-MCNC: 123 MG/DL (ref 74–99)
MAGNESIUM: 2.1 MG/DL (ref 1.6–2.6)
POTASSIUM REFLEX MAGNESIUM: 3.5 MMOL/L (ref 3.5–5)
REASON FOR REJECTION: NORMAL
REJECTED TEST: NORMAL
SODIUM BLD-SCNC: 142 MMOL/L (ref 132–146)
TOTAL PROTEIN: 6.1 G/DL (ref 6.4–8.3)

## 2019-08-27 PROCEDURE — 71045 X-RAY EXAM CHEST 1 VIEW: CPT

## 2019-08-27 PROCEDURE — 80053 COMPREHEN METABOLIC PANEL: CPT

## 2019-08-27 PROCEDURE — 2580000003 HC RX 258: Performed by: SPECIALIST

## 2019-08-27 PROCEDURE — 76937 US GUIDE VASCULAR ACCESS: CPT

## 2019-08-27 PROCEDURE — 6370000000 HC RX 637 (ALT 250 FOR IP): Performed by: FAMILY MEDICINE

## 2019-08-27 PROCEDURE — 6360000002 HC RX W HCPCS: Performed by: SPECIALIST

## 2019-08-27 PROCEDURE — 6370000000 HC RX 637 (ALT 250 FOR IP): Performed by: SPECIALIST

## 2019-08-27 PROCEDURE — 36415 COLL VENOUS BLD VENIPUNCTURE: CPT

## 2019-08-27 PROCEDURE — 83735 ASSAY OF MAGNESIUM: CPT

## 2019-08-27 PROCEDURE — C1751 CATH, INF, PER/CENT/MIDLINE: HCPCS

## 2019-08-27 PROCEDURE — 97535 SELF CARE MNGMENT TRAINING: CPT

## 2019-08-27 PROCEDURE — 36569 INSJ PICC 5 YR+ W/O IMAGING: CPT

## 2019-08-27 PROCEDURE — 97530 THERAPEUTIC ACTIVITIES: CPT

## 2019-08-27 PROCEDURE — 02HV33Z INSERTION OF INFUSION DEVICE INTO SUPERIOR VENA CAVA, PERCUTANEOUS APPROACH: ICD-10-PCS | Performed by: FAMILY MEDICINE

## 2019-08-27 PROCEDURE — 6360000002 HC RX W HCPCS: Performed by: NURSE PRACTITIONER

## 2019-08-27 RX ORDER — HYDROCODONE BITARTRATE AND ACETAMINOPHEN 5; 325 MG/1; MG/1
1 TABLET ORAL EVERY 8 HOURS PRN
Qty: 9 TABLET | Refills: 0 | Status: SHIPPED | OUTPATIENT
Start: 2019-08-27 | End: 2019-08-30

## 2019-08-27 RX ORDER — PETROLATUM 42 G/100G
OINTMENT TOPICAL 3 TIMES DAILY
Status: DISCONTINUED | OUTPATIENT
Start: 2019-08-27 | End: 2019-08-27 | Stop reason: HOSPADM

## 2019-08-27 RX ORDER — PETROLATUM 42 G/100G
OINTMENT TOPICAL 3 TIMES DAILY PRN
Status: DISCONTINUED | OUTPATIENT
Start: 2019-08-27 | End: 2019-08-27 | Stop reason: HOSPADM

## 2019-08-27 RX ADMIN — HEPARIN SODIUM 5000 UNITS: 10000 INJECTION INTRAVENOUS; SUBCUTANEOUS at 15:00

## 2019-08-27 RX ADMIN — VANCOMYCIN HYDROCHLORIDE 1000 MG: 1 INJECTION, POWDER, LYOPHILIZED, FOR SOLUTION INTRAVENOUS at 08:13

## 2019-08-27 RX ADMIN — DOCUSATE SODIUM 100 MG: 100 CAPSULE, LIQUID FILLED ORAL at 08:12

## 2019-08-27 RX ADMIN — LEVOFLOXACIN 500 MG: 500 TABLET, FILM COATED ORAL at 15:00

## 2019-08-27 RX ADMIN — HEPARIN SODIUM 5000 UNITS: 10000 INJECTION INTRAVENOUS; SUBCUTANEOUS at 06:11

## 2019-08-27 NOTE — PROGRESS NOTES
Physical Therapy      Name: Sheri García  : 1928  MRN: [id number]    Date of Service: 2019    Evaluating PT:  Patricia Fernando, PT, DPT    Room #:  2602/0750-A  Diagnosis:  Postop surgical complication   Reason for admission: L hip pain and drainage   Precautions:  Falls, WBAT LLE  Procedures: none this admission - prior,  R hip ORIF,  L femoral neck fx with hemiarthroplasty   Equipment recommendations:  [employer]    Pt is a poor historian. Admitted from [facility]. Active with therapy? Ambulating short distances with [employer].  Prior,  Pt lives with daughter in an apartment with 4 steps to enter. 8 steps inside to living area. Ambulating with [employer] and wheelchair? Initial Evaluation  Date:  Treatment  Short Term/ Long Term   Goals   AM-PAC 6 Clicks     Was pt agreeable to Eval/treatment? Yes  yes    Does pt have pain? 4/10 L knee  Pt grimaces with movement    Bed Mobility  Rolling: NT  Supine to sit: MaxA  Sit to supine: MaxA  Scooting: MaxA Supine to sit MaxA  Scooting MaxA Humberto   Transfers Sit to stand: MaxA  Stand to sit: ModA  Stand pivot: NT Sit to stand ModA  Stand to sit ModA  Stand pivot ModA with ww Humberto   Ambulation    10 ft with [employer] MaxA   4 feet x1 with ww ModA >50 ft with [employer]   Stair negotiation: ascended and descended  NT N/T NA   ROM BUE:  See OT eval  BLE:  WFL     Strength BUE:  See OT eval  BLE grossly:  3/5  4/5   Balance Sitting EOB:  SBA  Dynamic Standing:  MaxA Sitting SBA  Dynamic standing ModA Sitting EOB:  independent  Dynamic Standing:  Humberto   Endurance  Fair  fair Good      Therapeutic ex: Anastasiia An    Patient education  Pt educated on safety, sequencing during transfers, hip precautions, and role of PT     Patient response to education:   Pt verbalized understanding Pt demonstrated skill Pt requires further education in this area   Yes  No  Yes      Assessment/Comments  ---Pt received supine in bed and was agreeable to session with OT collaboration.  RN reported pt stable for participation prior to session. Pt alert but mildly confused in conversation . Requires heavy assistance to complete all transfers. Standing with B flexed knees d/t tightness and weakness. Flexed posture unable to correct. Ambulated short distance with use of WW and assist for balance, weight shifting, and movement of [employer]. Pt sat up in chair. Pt given call light.         Time in 11:24  Time out 11:47    Natalia Burns [id number]

## 2019-08-27 NOTE — PROGRESS NOTES
Department of Orthopedic Surgery  Resident Progress Note    Patient seen and examined. Pain controlled. No new complaints. Denies chest pain, shortness of breath, dizziness/lightheadedness. Denies fevers or chills, states left hip pain is improving, is still having knee pain.     VITALS:  /64   Pulse 80   Temp 98.4 °F (36.9 °C) (Temporal)   Resp 16   Ht 5' 8\" (1.727 m)   Wt 152 lb (68.9 kg) Comment: bedscale per RD  SpO2 94%   BMI 23.11 kg/m²     General: In no acute distress, alert to self and time    MUSCULOSKELETAL:   left lower extremity:  · Dressing C/D/I  · No expressible pus  · Redness resolved  · Compartments soft and compressible  · +2 pitting edema up to hip in bilateral lower extremities  · +PF/DF/EHL  · +2/4 DP & PT pulses, Brisk Cap refill, Toes warm and perfused  · Distal sensation grossly intact to Peroneals, Sural, Saphenous, and tibial nrs    CBC:   Lab Results   Component Value Date    WBC 9.2 08/23/2019    HGB 11.5 08/23/2019    HCT 35.6 08/23/2019     08/23/2019     PT/INR:    Lab Results   Component Value Date    PROTIME 13.0 07/26/2019    INR 1.1 07/26/2019       Wound cultures: Polymicrobial    ASSESSMENT  · Superficial wound infection    PLAN      · Continue physical therapy and protocol: WBAT - LLE  · IV abx per ID, ID recs on discharge  · Deep venous thrombosis prophylaxis - heparin per primary, early mobilization  · PT/OT  · Pain Control: PO  · Monitor H&H  · ID recommendations appreciated  · Okay to discharge from an orthopedic standpoint with strict follow up in one weeks time for re-evaluation  · D/C Plan:  Planning

## 2019-08-27 NOTE — DISCHARGE SUMMARY
Diagnoses:    Principal Problem:    Cellulitis of left hip  Active Problems:    Hypertension    CAD (coronary artery disease)    CKD (chronic kidney disease)    Postoperative surgical complication involving musculoskeletal system associated with musculoskeletal procedure    Anasarca    Moderate protein-calorie malnutrition (HCC)    Edema due to hypoalbuminemia  Resolved Problems:    * No resolved hospital problems. *        Discharge Instructions / Follow up:  None. Activity: activity as tolerated    Significant labs:  CBC:   No results for input(s): WBC, RBC, HGB, HCT, MCV, RDW, PLT in the last 72 hours. BMP:   Recent Labs     19    140 142   K 4.9 3.7 3.5    102 106   CO2 26 26 25   BUN 36* 34* 36*   CREATININE 1.2 1.2 1.1   MG  --   --  2.1     LFT:  Recent Labs     19   PROT 5.6* 5.7* 6.1*   ALKPHOS 132* 149* 180*   ALT 12 10 11   AST 28 19 18   BILITOT 0.6 0.7 0.5     PT/INR: No results for input(s): INR, APTT in the last 72 hours. BNP: No results for input(s): BNP in the last 72 hours. Hgb A1C: No results found for: LABA1C  Folate and B12: No results found for: [id number], No results found for: FOLATE  Thyroid Studies: No results found for: TSH, F9PBCON, D7JKDCM, THYROIDAB    Urinalysis:  No results found for: NITRU, [address], BACTERIA, RBCUA, BLOODU, SPECGRAV, GLUCOSEU    Imaging:  Xr Pelvis (1-2 Views)    Result Date: 2019  Patient MRN:  [id number] : 1928 Age: 80 years Gender: Male Order Date:  2019 8:45 AM EXAM: XR PELVIS (1-2 VIEWS) INDICATION:  pain pain COMPARISON: 2019 FINDINGS:  There are postsurgical changes in the hips. There are no acute fractures.      No acute process     Xr Hip Left (2-3 Views)    Result Date: 2019  Patient MRN:  [id number] : 1928 Age: 80 years Gender: Male Order Date:  2019 2:16 PM EXAM: XR HIP LEFT (2-3 VIEWS) NUMBER OF IMAGES:  3 views

## 2019-08-27 NOTE — PLAN OF CARE
COCCYX AND BUTTOCKS ARE VERY REDDENED BUT BLANCHABLE PT IS INCONTINENT AND INVOLUNTARY. PTS COCCYX AND BUTTOCKS CLEANSED WELL COPIOUS AMOUNTS OF HYDROPHOR APPLIED TO BOTTOM. KNEE HI TEDS SOILED NEW ONES ORDERED FROM [facility]. Alvino Denis

## 2019-08-28 LAB
ORGANISM: ABNORMAL
WOUND/ABSCESS: ABNORMAL

## 2019-09-06 DIAGNOSIS — S72.002A CLOSED DISPLACED FRACTURE OF LEFT FEMORAL NECK (HCC): Primary | ICD-10-CM

## 2019-09-09 ENCOUNTER — HOSPITAL ENCOUNTER (OUTPATIENT)
Dept: GENERAL RADIOLOGY | Age: 84
Discharge: HOME | End: 2019-09-11
Payer: MEDICARE

## 2019-09-09 ENCOUNTER — OFFICE VISIT (OUTPATIENT)
Dept: ORTHOPEDIC SURGERY | Age: 84
End: 2019-09-09
Payer: MEDICARE

## 2019-09-09 VITALS — HEART RATE: 74 BPM | SYSTOLIC BLOOD PRESSURE: 113 MMHG | DIASTOLIC BLOOD PRESSURE: 73 MMHG

## 2019-09-09 DIAGNOSIS — Z96.649 S/P HIP HEMIARTHROPLASTY: ICD-10-CM

## 2019-09-09 DIAGNOSIS — S72.002A CLOSED DISPLACED FRACTURE OF LEFT FEMORAL NECK (HCC): ICD-10-CM

## 2019-09-09 DIAGNOSIS — S72.002A CLOSED DISPLACED FRACTURE OF LEFT FEMORAL NECK (HCC): Primary | ICD-10-CM

## 2019-09-09 PROCEDURE — 99212 OFFICE O/P EST SF 10 MIN: CPT

## 2019-09-09 PROCEDURE — 99024 POSTOP FOLLOW-UP VISIT: CPT | Performed by: PHYSICIAN ASSISTANT

## 2019-09-09 PROCEDURE — 73502 X-RAY EXAM HIP UNI 2-3 VIEWS: CPT

## 2019-09-09 RX ORDER — HEPARIN SODIUM,PORCINE/PF 1 UNIT/ML
1 SYRINGE (ML) INTRAVENOUS EVERY 12 HOURS
Status: ON HOLD | COMMUNITY
End: 2019-09-23 | Stop reason: HOSPADM

## 2019-09-09 RX ORDER — HYDROCODONE BITARTRATE AND ACETAMINOPHEN 5; 325 MG/1; MG/1
1 TABLET ORAL EVERY 8 HOURS PRN
COMMUNITY

## 2019-09-10 PROBLEM — Z96.649 S/P HIP HEMIARTHROPLASTY: Status: ACTIVE | Noted: 2019-09-10

## 2019-09-10 NOTE — PROGRESS NOTES
noted  Compartments supple throughout thigh and leg  Calf supple and nontender  No pain with log roll of the left leg  Patient complaints of discomfort on the lateral left thigh distally towards the knee  Demonstrates active knee flexion/extension, ankle plantar/dorsiflexion/great toe extension. States sensation intact to touch in sural/deep peroneal/superficial peroneal/saphenous/posterior tibial nerve distributions to foot/ankle. Palpable dorsalis pedis and posterior tibialis pulses, cap refill brisk in toes, foot warm/perfused. /73 (Site: Left Upper Arm, Position: Sitting, Cuff Size: Large Adult)   Pulse 74     XR:   Pelvis and left hip straits no interval change in alignment. No evidence of hardware failure or lucency. Hemiarthoplasty well-seated. No other acute osseous abnormality appreciated. Assessment:   Diagnosis Orders   1. Closed displaced fracture of left femoral neck ([address]     2. S/P hip hemiarthroplasty         Plan:  Orders for [facility]  Patient's x-rays of left hip and pelvis show hemiarthroplasty in good alignment  Patient's incision no longer red or warm, no drainage on dressings today  Patient may be weightbearing on tolerated to left lower extremity  Pain Control per facility physician  IV antibiotics per infectious disease  Patient to continue work with therapy on lower extreme a strengthening, gait training, for safety and fall prevention  Okay to decrease DVT prophylaxis to an aspirin 81 mg twice daily  Continue with ice to left hip for swelling 2-3 times per day  Continue with daily incisional checks to left hip  Continue to cover with DSD to prevent irritation and monitor for any drainage    Next APT scheduled for 10/24/19 at 2:00 PM with Dr. Sha Kirkpatrick    Electronically signed by Dorothea Millan PA-C on 9/10/2019 at 8:05 AM  Note: This report was completed using computerIsowalk voiced recognition software.   Every effort has been made to ensure accuracy; however,

## 2019-09-18 ENCOUNTER — HOSPITAL ENCOUNTER (INPATIENT)
Age: 84
LOS: 6 days | Discharge: HOSPICE | DRG: 314 | End: 2019-09-24
Attending: EMERGENCY MEDICINE | Admitting: INTERNAL MEDICINE
Payer: MEDICARE

## 2019-09-18 ENCOUNTER — APPOINTMENT (OUTPATIENT)
Dept: CT IMAGING | Age: 84
DRG: 314 | End: 2019-09-18
Payer: MEDICARE

## 2019-09-18 DIAGNOSIS — J93.83 OTHER PNEUMOTHORAX: ICD-10-CM

## 2019-09-18 DIAGNOSIS — I48.91 ATRIAL FIBRILLATION, UNSPECIFIED TYPE (HCC): Primary | ICD-10-CM

## 2019-09-18 DIAGNOSIS — I73.9 PERIPHERAL VASCULAR DISEASE (HCC): ICD-10-CM

## 2019-09-18 LAB
ANION GAP SERPL CALCULATED.3IONS-SCNC: 13 MMOL/L (ref 7–16)
APTT: 33.9 SEC (ref 24.5–35.1)
BASOPHILS ABSOLUTE: 0.02 E9/L (ref 0–0.2)
BASOPHILS RELATIVE PERCENT: 0.2 % (ref 0–2)
BUN BLDV-MCNC: 73 MG/DL (ref 8–23)
CALCIUM SERPL-MCNC: 7.6 MG/DL (ref 8.6–10.2)
CHLORIDE BLD-SCNC: 97 MMOL/L (ref 98–107)
CO2: 24 MMOL/L (ref 22–29)
CREAT SERPL-MCNC: 2.5 MG/DL (ref 0.7–1.2)
EOSINOPHILS ABSOLUTE: 0 E9/L (ref 0.05–0.5)
EOSINOPHILS RELATIVE PERCENT: 0 % (ref 0–6)
GFR AFRICAN AMERICAN: 29
GFR NON-AFRICAN AMERICAN: 24 ML/MIN/1.73
GLUCOSE BLD-MCNC: 118 MG/DL (ref 74–99)
HCT VFR BLD CALC: 34.1 % (ref 37–54)
HEMOGLOBIN: 12.1 G/DL (ref 12.5–16.5)
IMMATURE GRANULOCYTES #: 0.25 E9/L
IMMATURE GRANULOCYTES %: 2.1 % (ref 0–5)
INR BLD: 1.2
LACTIC ACID: 2.8 MMOL/L (ref 0.5–2.2)
LYMPHOCYTES ABSOLUTE: 1.21 E9/L (ref 1.5–4)
LYMPHOCYTES RELATIVE PERCENT: 10.2 % (ref 20–42)
MCH RBC QN AUTO: 31.1 PG (ref 26–35)
MCHC RBC AUTO-ENTMCNC: 35.5 % (ref 32–34.5)
MCV RBC AUTO: 87.7 FL (ref 80–99.9)
MONOCYTES ABSOLUTE: 1.01 E9/L (ref 0.1–0.95)
MONOCYTES RELATIVE PERCENT: 8.5 % (ref 2–12)
NEUTROPHILS ABSOLUTE: 9.41 E9/L (ref 1.8–7.3)
NEUTROPHILS RELATIVE PERCENT: 79 % (ref 43–80)
PDW BLD-RTO: 17.3 FL (ref 11.5–15)
PLATELET # BLD: 275 E9/L (ref 130–450)
PMV BLD AUTO: 9.4 FL (ref 7–12)
POTASSIUM REFLEX MAGNESIUM: 4 MMOL/L (ref 3.5–5)
PROTHROMBIN TIME: 13 SEC (ref 9.3–12.4)
RBC # BLD: 3.89 E12/L (ref 3.8–5.8)
SODIUM BLD-SCNC: 134 MMOL/L (ref 132–146)
TROPONIN: 0.01 NG/ML (ref 0–0.03)
WBC # BLD: 11.9 E9/L (ref 4.5–11.5)

## 2019-09-18 PROCEDURE — 80048 BASIC METABOLIC PNL TOTAL CA: CPT

## 2019-09-18 PROCEDURE — 75635 CT ANGIO ABDOMINAL ARTERIES: CPT

## 2019-09-18 PROCEDURE — 6360000004 HC RX CONTRAST MEDICATION: Performed by: RADIOLOGY

## 2019-09-18 PROCEDURE — 85730 THROMBOPLASTIN TIME PARTIAL: CPT

## 2019-09-18 PROCEDURE — 36415 COLL VENOUS BLD VENIPUNCTURE: CPT

## 2019-09-18 PROCEDURE — 84484 ASSAY OF TROPONIN QUANT: CPT

## 2019-09-18 PROCEDURE — 93005 ELECTROCARDIOGRAM TRACING: CPT | Performed by: EMERGENCY MEDICINE

## 2019-09-18 PROCEDURE — 1200000000 HC SEMI PRIVATE

## 2019-09-18 PROCEDURE — 6370000000 HC RX 637 (ALT 250 FOR IP): Performed by: EMERGENCY MEDICINE

## 2019-09-18 PROCEDURE — 85610 PROTHROMBIN TIME: CPT

## 2019-09-18 PROCEDURE — 83605 ASSAY OF LACTIC ACID: CPT

## 2019-09-18 PROCEDURE — 2580000003 HC RX 258: Performed by: EMERGENCY MEDICINE

## 2019-09-18 PROCEDURE — 99291 CRITICAL CARE FIRST HOUR: CPT

## 2019-09-18 PROCEDURE — 85025 COMPLETE CBC W/AUTO DIFF WBC: CPT

## 2019-09-18 RX ORDER — FUROSEMIDE 40 MG/1
40 TABLET ORAL DAILY
COMMUNITY

## 2019-09-18 RX ORDER — CLOPIDOGREL BISULFATE 75 MG/1
75 TABLET ORAL ONCE
Status: COMPLETED | OUTPATIENT
Start: 2019-09-18 | End: 2019-09-18

## 2019-09-18 RX ORDER — SODIUM PHOSPHATE, DIBASIC AND SODIUM PHOSPHATE, MONOBASIC 7; 19 G/133ML; G/133ML
1 ENEMA RECTAL DAILY PRN
COMMUNITY

## 2019-09-18 RX ORDER — 0.9 % SODIUM CHLORIDE 0.9 %
1000 INTRAVENOUS SOLUTION INTRAVENOUS ONCE
Status: COMPLETED | OUTPATIENT
Start: 2019-09-18 | End: 2019-09-18

## 2019-09-18 RX ORDER — ASPIRIN 81 MG/1
81 TABLET, CHEWABLE ORAL 2 TIMES DAILY
Status: ON HOLD | COMMUNITY
End: 2019-09-23 | Stop reason: HOSPADM

## 2019-09-18 RX ORDER — SODIUM PHOSPHATE,MONO-DIBASIC 19G-7G/118
1 ENEMA (ML) RECTAL DAILY PRN
Status: DISCONTINUED | OUTPATIENT
Start: 2019-09-18 | End: 2019-09-24 | Stop reason: HOSPADM

## 2019-09-18 RX ORDER — BISACODYL 10 MG
10 SUPPOSITORY, RECTAL RECTAL DAILY PRN
Status: DISCONTINUED | OUTPATIENT
Start: 2019-09-18 | End: 2019-09-24 | Stop reason: HOSPADM

## 2019-09-18 RX ORDER — M-VIT,TX,IRON,MINS/CALC/FOLIC 27MG-0.4MG
1 TABLET ORAL DAILY
COMMUNITY

## 2019-09-18 RX ORDER — BISACODYL 10 MG
10 SUPPOSITORY, RECTAL RECTAL DAILY PRN
COMMUNITY

## 2019-09-18 RX ORDER — ACETAMINOPHEN 325 MG/1
650 TABLET ORAL EVERY 6 HOURS PRN
Status: DISCONTINUED | OUTPATIENT
Start: 2019-09-18 | End: 2019-09-24 | Stop reason: HOSPADM

## 2019-09-18 RX ORDER — HEPARIN SODIUM (PORCINE) LOCK FLUSH IV SOLN 100 UNIT/ML 100 UNIT/ML
3 SOLUTION INTRAVENOUS 2 TIMES DAILY
Status: DISCONTINUED | OUTPATIENT
Start: 2019-09-18 | End: 2019-09-24 | Stop reason: HOSPADM

## 2019-09-18 RX ORDER — HYDROCODONE BITARTRATE AND ACETAMINOPHEN 5; 325 MG/1; MG/1
1 TABLET ORAL EVERY 8 HOURS PRN
Status: DISCONTINUED | OUTPATIENT
Start: 2019-09-18 | End: 2019-09-24 | Stop reason: HOSPADM

## 2019-09-18 RX ORDER — ASPIRIN 81 MG/1
81 TABLET, CHEWABLE ORAL 2 TIMES DAILY
Status: DISCONTINUED | OUTPATIENT
Start: 2019-09-18 | End: 2019-09-21

## 2019-09-18 RX ORDER — M-VIT,TX,IRON,MINS/CALC/FOLIC 27MG-0.4MG
1 TABLET ORAL DAILY
Status: DISCONTINUED | OUTPATIENT
Start: 2019-09-19 | End: 2019-09-24 | Stop reason: HOSPADM

## 2019-09-18 RX ADMIN — IOPAMIDOL 150 ML: 755 INJECTION, SOLUTION INTRAVENOUS at 18:44

## 2019-09-18 RX ADMIN — SODIUM CHLORIDE 1000 ML: 9 INJECTION, SOLUTION INTRAVENOUS at 19:46

## 2019-09-18 RX ADMIN — CLOPIDOGREL BISULFATE 75 MG: 75 TABLET ORAL at 22:38

## 2019-09-18 ASSESSMENT — ENCOUNTER SYMPTOMS
EYE PAIN: 0
EYE REDNESS: 0
NAUSEA: 0
BACK PAIN: 0
EYE DISCHARGE: 0
WHEEZING: 0
SORE THROAT: 0
VOMITING: 0
COUGH: 0
SINUS PRESSURE: 0
ABDOMINAL PAIN: 0
DIARRHEA: 0
COLOR CHANGE: 1
SHORTNESS OF BREATH: 0

## 2019-09-19 ENCOUNTER — APPOINTMENT (OUTPATIENT)
Dept: CT IMAGING | Age: 84
DRG: 314 | End: 2019-09-19
Payer: MEDICARE

## 2019-09-19 ENCOUNTER — APPOINTMENT (OUTPATIENT)
Dept: ULTRASOUND IMAGING | Age: 84
DRG: 314 | End: 2019-09-19
Payer: MEDICARE

## 2019-09-19 PROBLEM — E44.0 MODERATE PROTEIN-CALORIE MALNUTRITION (HCC): Status: ACTIVE | Noted: 2019-09-19

## 2019-09-19 LAB
ALBUMIN SERPL-MCNC: 2.3 G/DL (ref 3.5–5.2)
ALP BLD-CCNC: 190 U/L (ref 40–129)
ALT SERPL-CCNC: 12 U/L (ref 0–40)
ANION GAP SERPL CALCULATED.3IONS-SCNC: 12 MMOL/L (ref 7–16)
AST SERPL-CCNC: 26 U/L (ref 0–39)
BILIRUB SERPL-MCNC: 0.7 MG/DL (ref 0–1.2)
BUN BLDV-MCNC: 67 MG/DL (ref 8–23)
CALCIUM SERPL-MCNC: 7.5 MG/DL (ref 8.6–10.2)
CEA: 8.5 NG/ML (ref 0–5.2)
CHLORIDE BLD-SCNC: 99 MMOL/L (ref 98–107)
CK MB: 2 NG/ML (ref 0–7.7)
CK MB: 2.2 NG/ML (ref 0–7.7)
CO2: 24 MMOL/L (ref 22–29)
CREAT SERPL-MCNC: 2.5 MG/DL (ref 0.7–1.2)
EKG ATRIAL RATE: 127 BPM
EKG Q-T INTERVAL: 408 MS
EKG QRS DURATION: 132 MS
EKG QTC CALCULATION (BAZETT): 479 MS
EKG R AXIS: 22 DEGREES
EKG T AXIS: 180 DEGREES
EKG VENTRICULAR RATE: 83 BPM
GFR AFRICAN AMERICAN: 29
GFR NON-AFRICAN AMERICAN: 24 ML/MIN/1.73
GLUCOSE BLD-MCNC: 85 MG/DL (ref 74–99)
HCT VFR BLD CALC: 34.4 % (ref 37–54)
HEMOGLOBIN: 11.8 G/DL (ref 12.5–16.5)
MCH RBC QN AUTO: 31 PG (ref 26–35)
MCHC RBC AUTO-ENTMCNC: 34.3 % (ref 32–34.5)
MCV RBC AUTO: 90.3 FL (ref 80–99.9)
PDW BLD-RTO: 17.2 FL (ref 11.5–15)
PLATELET # BLD: 263 E9/L (ref 130–450)
PMV BLD AUTO: 10 FL (ref 7–12)
POTASSIUM SERPL-SCNC: 3.5 MMOL/L (ref 3.5–5)
PREALBUMIN: 10 MG/DL (ref 20–40)
PROSTATE SPECIFIC ANTIGEN: 0.14 NG/ML (ref 0–4)
RBC # BLD: 3.81 E12/L (ref 3.8–5.8)
SODIUM BLD-SCNC: 135 MMOL/L (ref 132–146)
TOTAL CK: 34 U/L (ref 20–200)
TOTAL CK: 36 U/L (ref 20–200)
TOTAL PROTEIN: 4.8 G/DL (ref 6.4–8.3)
TROPONIN: 0.01 NG/ML (ref 0–0.03)
TROPONIN: 0.02 NG/ML (ref 0–0.03)
TSH SERPL DL<=0.05 MIU/L-ACNC: 15.61 UIU/ML (ref 0.27–4.2)
VANCOMYCIN RANDOM: 19.5 MCG/ML (ref 5–40)
WBC # BLD: 10.8 E9/L (ref 4.5–11.5)

## 2019-09-19 PROCEDURE — 6370000000 HC RX 637 (ALT 250 FOR IP): Performed by: INTERNAL MEDICINE

## 2019-09-19 PROCEDURE — 97161 PT EVAL LOW COMPLEX 20 MIN: CPT | Performed by: PHYSICAL THERAPIST

## 2019-09-19 PROCEDURE — 93970 EXTREMITY STUDY: CPT

## 2019-09-19 PROCEDURE — P9047 ALBUMIN (HUMAN), 25%, 50ML: HCPCS | Performed by: INTERNAL MEDICINE

## 2019-09-19 PROCEDURE — 2580000003 HC RX 258: Performed by: INTERNAL MEDICINE

## 2019-09-19 PROCEDURE — 85027 COMPLETE CBC AUTOMATED: CPT

## 2019-09-19 PROCEDURE — 6360000002 HC RX W HCPCS: Performed by: INTERNAL MEDICINE

## 2019-09-19 PROCEDURE — 97165 OT EVAL LOW COMPLEX 30 MIN: CPT

## 2019-09-19 PROCEDURE — 36415 COLL VENOUS BLD VENIPUNCTURE: CPT

## 2019-09-19 PROCEDURE — 6360000002 HC RX W HCPCS: Performed by: SPECIALIST

## 2019-09-19 PROCEDURE — 71250 CT THORAX DX C-: CPT

## 2019-09-19 PROCEDURE — 97530 THERAPEUTIC ACTIVITIES: CPT | Performed by: PHYSICAL THERAPIST

## 2019-09-19 PROCEDURE — 0W993ZZ DRAINAGE OF RIGHT PLEURAL CAVITY, PERCUTANEOUS APPROACH: ICD-10-PCS | Performed by: SPECIALIST

## 2019-09-19 PROCEDURE — 80202 ASSAY OF VANCOMYCIN: CPT

## 2019-09-19 PROCEDURE — 84484 ASSAY OF TROPONIN QUANT: CPT

## 2019-09-19 PROCEDURE — 93010 ELECTROCARDIOGRAM REPORT: CPT | Performed by: INTERNAL MEDICINE

## 2019-09-19 PROCEDURE — 84134 ASSAY OF PREALBUMIN: CPT

## 2019-09-19 PROCEDURE — 1200000000 HC SEMI PRIVATE

## 2019-09-19 PROCEDURE — 84443 ASSAY THYROID STIM HORMONE: CPT

## 2019-09-19 PROCEDURE — 82550 ASSAY OF CK (CPK): CPT

## 2019-09-19 PROCEDURE — 82553 CREATINE MB FRACTION: CPT

## 2019-09-19 PROCEDURE — 80053 COMPREHEN METABOLIC PANEL: CPT

## 2019-09-19 PROCEDURE — 82378 CARCINOEMBRYONIC ANTIGEN: CPT

## 2019-09-19 PROCEDURE — 6370000000 HC RX 637 (ALT 250 FOR IP): Performed by: SPECIALIST

## 2019-09-19 PROCEDURE — G0103 PSA SCREENING: HCPCS

## 2019-09-19 PROCEDURE — 97530 THERAPEUTIC ACTIVITIES: CPT

## 2019-09-19 RX ORDER — SODIUM CHLORIDE 0.9 % (FLUSH) 0.9 %
10 SYRINGE (ML) INJECTION PRN
Status: DISCONTINUED | OUTPATIENT
Start: 2019-09-19 | End: 2019-09-24 | Stop reason: HOSPADM

## 2019-09-19 RX ORDER — FUROSEMIDE 10 MG/ML
40 INJECTION INTRAMUSCULAR; INTRAVENOUS ONCE
Status: COMPLETED | OUTPATIENT
Start: 2019-09-19 | End: 2019-09-19

## 2019-09-19 RX ORDER — METOPROLOL SUCCINATE 25 MG/1
25 TABLET, EXTENDED RELEASE ORAL DAILY
Status: DISCONTINUED | OUTPATIENT
Start: 2019-09-19 | End: 2019-09-24 | Stop reason: HOSPADM

## 2019-09-19 RX ORDER — FUROSEMIDE 10 MG/ML
20 INJECTION INTRAMUSCULAR; INTRAVENOUS DAILY
Status: DISCONTINUED | OUTPATIENT
Start: 2019-09-19 | End: 2019-09-22

## 2019-09-19 RX ORDER — LEVOFLOXACIN 500 MG/1
500 TABLET, FILM COATED ORAL EVERY OTHER DAY
Status: DISCONTINUED | OUTPATIENT
Start: 2019-09-20 | End: 2019-09-24 | Stop reason: HOSPADM

## 2019-09-19 RX ORDER — SODIUM CHLORIDE 0.9 % (FLUSH) 0.9 %
10 SYRINGE (ML) INJECTION EVERY 12 HOURS SCHEDULED
Status: DISCONTINUED | OUTPATIENT
Start: 2019-09-19 | End: 2019-09-24 | Stop reason: HOSPADM

## 2019-09-19 RX ORDER — ACETAMINOPHEN 325 MG/1
650 TABLET ORAL EVERY 4 HOURS PRN
Status: DISCONTINUED | OUTPATIENT
Start: 2019-09-19 | End: 2019-09-24 | Stop reason: HOSPADM

## 2019-09-19 RX ORDER — ALBUMIN (HUMAN) 12.5 G/50ML
25 SOLUTION INTRAVENOUS ONCE
Status: COMPLETED | OUTPATIENT
Start: 2019-09-19 | End: 2019-09-19

## 2019-09-19 RX ADMIN — Medication 300 UNITS: at 23:06

## 2019-09-19 RX ADMIN — ASPIRIN 81 MG 81 MG: 81 TABLET ORAL at 08:06

## 2019-09-19 RX ADMIN — ALBUMIN (HUMAN) 25 G: 0.25 INJECTION, SOLUTION INTRAVENOUS at 09:53

## 2019-09-19 RX ADMIN — ASPIRIN 81 MG 81 MG: 81 TABLET ORAL at 23:03

## 2019-09-19 RX ADMIN — FUROSEMIDE 20 MG: 10 INJECTION, SOLUTION INTRAMUSCULAR; INTRAVENOUS at 23:05

## 2019-09-19 RX ADMIN — Medication 10 ML: at 23:06

## 2019-09-19 RX ADMIN — METOPROLOL SUCCINATE 25 MG: 25 TABLET, EXTENDED RELEASE ORAL at 23:03

## 2019-09-19 RX ADMIN — FUROSEMIDE 40 MG: 10 INJECTION, SOLUTION INTRAMUSCULAR; INTRAVENOUS at 09:53

## 2019-09-19 RX ADMIN — MULTIPLE VITAMINS W/ MINERALS TAB 1 TABLET: TAB at 08:06

## 2019-09-19 RX ADMIN — Medication 10 ML: at 08:06

## 2019-09-19 RX ADMIN — Medication 300 UNITS: at 08:06

## 2019-09-19 ASSESSMENT — PAIN SCALES - GENERAL
PAINLEVEL_OUTOF10: 6
PAINLEVEL_OUTOF10: 0
PAINLEVEL_OUTOF10: 0

## 2019-09-19 ASSESSMENT — PAIN DESCRIPTION - DESCRIPTORS: DESCRIPTORS: ACHING;CONSTANT;DISCOMFORT

## 2019-09-19 ASSESSMENT — PAIN DESCRIPTION - PAIN TYPE: TYPE: CHRONIC PAIN

## 2019-09-19 ASSESSMENT — PAIN DESCRIPTION - ORIENTATION: ORIENTATION: LEFT

## 2019-09-19 ASSESSMENT — PAIN DESCRIPTION - FREQUENCY: FREQUENCY: CONTINUOUS

## 2019-09-19 ASSESSMENT — PAIN DESCRIPTION - LOCATION: LOCATION: LEG;HIP

## 2019-09-20 ENCOUNTER — APPOINTMENT (OUTPATIENT)
Dept: INTERVENTIONAL RADIOLOGY/VASCULAR | Age: 84
DRG: 314 | End: 2019-09-20
Payer: MEDICARE

## 2019-09-20 ENCOUNTER — APPOINTMENT (OUTPATIENT)
Dept: GENERAL RADIOLOGY | Age: 84
DRG: 314 | End: 2019-09-20
Payer: MEDICARE

## 2019-09-20 LAB
ALBUMIN SERPL-MCNC: 2.3 G/DL (ref 3.5–5.2)
ALP BLD-CCNC: 164 U/L (ref 40–129)
ALT SERPL-CCNC: 11 U/L (ref 0–40)
ANION GAP SERPL CALCULATED.3IONS-SCNC: 15 MMOL/L (ref 7–16)
AST SERPL-CCNC: 24 U/L (ref 0–39)
BASOPHILS ABSOLUTE: 0.02 E9/L (ref 0–0.2)
BASOPHILS RELATIVE PERCENT: 0.2 % (ref 0–2)
BILIRUB SERPL-MCNC: 0.7 MG/DL (ref 0–1.2)
BUN BLDV-MCNC: 67 MG/DL (ref 8–23)
CALCIUM SERPL-MCNC: 7.6 MG/DL (ref 8.6–10.2)
CHLORIDE BLD-SCNC: 100 MMOL/L (ref 98–107)
CO2: 20 MMOL/L (ref 22–29)
CREAT SERPL-MCNC: 2.7 MG/DL (ref 0.7–1.2)
EOSINOPHILS ABSOLUTE: 0.01 E9/L (ref 0.05–0.5)
EOSINOPHILS RELATIVE PERCENT: 0.1 % (ref 0–6)
GFR AFRICAN AMERICAN: 27
GFR NON-AFRICAN AMERICAN: 22 ML/MIN/1.73
GLUCOSE BLD-MCNC: 82 MG/DL (ref 74–99)
HCT VFR BLD CALC: 34.1 % (ref 37–54)
HEMOGLOBIN: 11.5 G/DL (ref 12.5–16.5)
IMMATURE GRANULOCYTES #: 0.25 E9/L
IMMATURE GRANULOCYTES %: 2.3 % (ref 0–5)
LV EF: 34 %
LVEF MODALITY: NORMAL
LYMPHOCYTES ABSOLUTE: 1.44 E9/L (ref 1.5–4)
LYMPHOCYTES RELATIVE PERCENT: 13.2 % (ref 20–42)
MCH RBC QN AUTO: 31.3 PG (ref 26–35)
MCHC RBC AUTO-ENTMCNC: 33.7 % (ref 32–34.5)
MCV RBC AUTO: 92.9 FL (ref 80–99.9)
MONOCYTES ABSOLUTE: 1.03 E9/L (ref 0.1–0.95)
MONOCYTES RELATIVE PERCENT: 9.5 % (ref 2–12)
NEUTROPHILS ABSOLUTE: 8.12 E9/L (ref 1.8–7.3)
NEUTROPHILS RELATIVE PERCENT: 74.7 % (ref 43–80)
PDW BLD-RTO: 18.7 FL (ref 11.5–15)
PLATELET # BLD: 228 E9/L (ref 130–450)
PMV BLD AUTO: 9.7 FL (ref 7–12)
POTASSIUM SERPL-SCNC: 3.4 MMOL/L (ref 3.5–5)
RBC # BLD: 3.67 E12/L (ref 3.8–5.8)
SODIUM BLD-SCNC: 135 MMOL/L (ref 132–146)
T4 FREE: 0.84 NG/DL (ref 0.93–1.7)
TOTAL PROTEIN: 5 G/DL (ref 6.4–8.3)
WBC # BLD: 10.9 E9/L (ref 4.5–11.5)

## 2019-09-20 PROCEDURE — 6370000000 HC RX 637 (ALT 250 FOR IP): Performed by: INTERNAL MEDICINE

## 2019-09-20 PROCEDURE — 32555 ASPIRATE PLEURA W/ IMAGING: CPT | Performed by: RADIOLOGY

## 2019-09-20 PROCEDURE — 85025 COMPLETE CBC W/AUTO DIFF WBC: CPT

## 2019-09-20 PROCEDURE — 2580000003 HC RX 258: Performed by: INTERNAL MEDICINE

## 2019-09-20 PROCEDURE — 93306 TTE W/DOPPLER COMPLETE: CPT

## 2019-09-20 PROCEDURE — 71045 X-RAY EXAM CHEST 1 VIEW: CPT

## 2019-09-20 PROCEDURE — 1200000000 HC SEMI PRIVATE

## 2019-09-20 PROCEDURE — 6360000002 HC RX W HCPCS: Performed by: SPECIALIST

## 2019-09-20 PROCEDURE — 97110 THERAPEUTIC EXERCISES: CPT

## 2019-09-20 PROCEDURE — C1729 CATH, DRAINAGE: HCPCS

## 2019-09-20 PROCEDURE — 80053 COMPREHEN METABOLIC PANEL: CPT

## 2019-09-20 PROCEDURE — 6360000002 HC RX W HCPCS: Performed by: INTERNAL MEDICINE

## 2019-09-20 PROCEDURE — 97530 THERAPEUTIC ACTIVITIES: CPT

## 2019-09-20 PROCEDURE — 84439 ASSAY OF FREE THYROXINE: CPT

## 2019-09-20 PROCEDURE — 6370000000 HC RX 637 (ALT 250 FOR IP): Performed by: SPECIALIST

## 2019-09-20 PROCEDURE — 36415 COLL VENOUS BLD VENIPUNCTURE: CPT

## 2019-09-20 RX ADMIN — Medication 300 UNITS: at 20:57

## 2019-09-20 RX ADMIN — LEVOFLOXACIN 500 MG: 500 TABLET, FILM COATED ORAL at 09:15

## 2019-09-20 RX ADMIN — MULTIPLE VITAMINS W/ MINERALS TAB 1 TABLET: TAB at 09:15

## 2019-09-20 RX ADMIN — VANCOMYCIN HYDROCHLORIDE 1000 MG: 1 INJECTION, POWDER, LYOPHILIZED, FOR SOLUTION INTRAVENOUS at 03:04

## 2019-09-20 RX ADMIN — Medication 10 ML: at 20:57

## 2019-09-20 RX ADMIN — FUROSEMIDE 20 MG: 10 INJECTION, SOLUTION INTRAMUSCULAR; INTRAVENOUS at 09:15

## 2019-09-20 RX ADMIN — Medication 10 ML: at 09:15

## 2019-09-20 RX ADMIN — ASPIRIN 81 MG 81 MG: 81 TABLET ORAL at 20:56

## 2019-09-20 RX ADMIN — METOPROLOL SUCCINATE 25 MG: 25 TABLET, EXTENDED RELEASE ORAL at 09:15

## 2019-09-20 ASSESSMENT — PAIN SCALES - GENERAL
PAINLEVEL_OUTOF10: 0

## 2019-09-21 LAB
CEA: 9 NG/ML (ref 0–5.2)
VANCOMYCIN RANDOM: 23.9 MCG/ML (ref 5–40)

## 2019-09-21 PROCEDURE — 6370000000 HC RX 637 (ALT 250 FOR IP): Performed by: SPECIALIST

## 2019-09-21 PROCEDURE — 6360000002 HC RX W HCPCS: Performed by: INTERNAL MEDICINE

## 2019-09-21 PROCEDURE — 36415 COLL VENOUS BLD VENIPUNCTURE: CPT

## 2019-09-21 PROCEDURE — 6370000000 HC RX 637 (ALT 250 FOR IP): Performed by: INTERNAL MEDICINE

## 2019-09-21 PROCEDURE — 1200000000 HC SEMI PRIVATE

## 2019-09-21 PROCEDURE — 80202 ASSAY OF VANCOMYCIN: CPT

## 2019-09-21 PROCEDURE — 82378 CARCINOEMBRYONIC ANTIGEN: CPT

## 2019-09-21 PROCEDURE — 6360000002 HC RX W HCPCS: Performed by: SPECIALIST

## 2019-09-21 PROCEDURE — 2580000003 HC RX 258: Performed by: INTERNAL MEDICINE

## 2019-09-21 RX ADMIN — APIXABAN 2.5 MG: 2.5 TABLET, FILM COATED ORAL at 21:59

## 2019-09-21 RX ADMIN — MULTIPLE VITAMINS W/ MINERALS TAB 1 TABLET: TAB at 08:09

## 2019-09-21 RX ADMIN — Medication 10 ML: at 21:59

## 2019-09-21 RX ADMIN — APIXABAN 2.5 MG: 2.5 TABLET, FILM COATED ORAL at 11:23

## 2019-09-21 RX ADMIN — Medication 300 UNITS: at 08:08

## 2019-09-21 RX ADMIN — ASPIRIN 81 MG 81 MG: 81 TABLET ORAL at 08:09

## 2019-09-21 RX ADMIN — Medication 300 UNITS: at 21:59

## 2019-09-21 RX ADMIN — METOPROLOL SUCCINATE 25 MG: 25 TABLET, EXTENDED RELEASE ORAL at 08:11

## 2019-09-21 RX ADMIN — Medication 10 ML: at 08:09

## 2019-09-21 RX ADMIN — FUROSEMIDE 20 MG: 10 INJECTION, SOLUTION INTRAMUSCULAR; INTRAVENOUS at 08:08

## 2019-09-21 ASSESSMENT — PAIN SCALES - GENERAL
PAINLEVEL_OUTOF10: 0
PAINLEVEL_OUTOF10: 0

## 2019-09-22 LAB
CREAT SERPL-MCNC: 3.3 MG/DL (ref 0.7–1.2)
GFR AFRICAN AMERICAN: 21
GFR NON-AFRICAN AMERICAN: 18 ML/MIN/1.73
VANCOMYCIN RANDOM: 26.6 MCG/ML (ref 5–40)

## 2019-09-22 PROCEDURE — 6370000000 HC RX 637 (ALT 250 FOR IP): Performed by: INTERNAL MEDICINE

## 2019-09-22 PROCEDURE — 6360000002 HC RX W HCPCS: Performed by: INTERNAL MEDICINE

## 2019-09-22 PROCEDURE — 2580000003 HC RX 258: Performed by: INTERNAL MEDICINE

## 2019-09-22 PROCEDURE — 82565 ASSAY OF CREATININE: CPT

## 2019-09-22 PROCEDURE — 99221 1ST HOSP IP/OBS SF/LOW 40: CPT | Performed by: NURSE PRACTITIONER

## 2019-09-22 PROCEDURE — 6370000000 HC RX 637 (ALT 250 FOR IP): Performed by: SPECIALIST

## 2019-09-22 PROCEDURE — 80202 ASSAY OF VANCOMYCIN: CPT

## 2019-09-22 PROCEDURE — 36415 COLL VENOUS BLD VENIPUNCTURE: CPT

## 2019-09-22 PROCEDURE — 1200000000 HC SEMI PRIVATE

## 2019-09-22 RX ORDER — FUROSEMIDE 40 MG/1
40 TABLET ORAL DAILY
Status: DISCONTINUED | OUTPATIENT
Start: 2019-09-22 | End: 2019-09-24 | Stop reason: HOSPADM

## 2019-09-22 RX ADMIN — Medication 300 UNITS: at 20:34

## 2019-09-22 RX ADMIN — Medication 10 ML: at 20:34

## 2019-09-22 RX ADMIN — FUROSEMIDE 40 MG: 40 TABLET ORAL at 09:42

## 2019-09-22 RX ADMIN — LEVOFLOXACIN 500 MG: 500 TABLET, FILM COATED ORAL at 07:56

## 2019-09-22 RX ADMIN — METOPROLOL SUCCINATE 25 MG: 25 TABLET, EXTENDED RELEASE ORAL at 07:56

## 2019-09-22 RX ADMIN — APIXABAN 2.5 MG: 2.5 TABLET, FILM COATED ORAL at 07:56

## 2019-09-22 RX ADMIN — Medication 300 UNITS: at 07:56

## 2019-09-22 RX ADMIN — APIXABAN 2.5 MG: 2.5 TABLET, FILM COATED ORAL at 20:34

## 2019-09-22 RX ADMIN — Medication 10 ML: at 07:55

## 2019-09-22 RX ADMIN — MULTIPLE VITAMINS W/ MINERALS TAB 1 TABLET: TAB at 07:56

## 2019-09-22 ASSESSMENT — PAIN SCALES - GENERAL
PAINLEVEL_OUTOF10: 0
PAINLEVEL_OUTOF10: 0

## 2019-09-23 VITALS
SYSTOLIC BLOOD PRESSURE: 111 MMHG | HEIGHT: 69 IN | TEMPERATURE: 98.1 F | OXYGEN SATURATION: 99 % | BODY MASS INDEX: 23.25 KG/M2 | DIASTOLIC BLOOD PRESSURE: 64 MMHG | HEART RATE: 70 BPM | WEIGHT: 157 LBS | RESPIRATION RATE: 16 BRPM

## 2019-09-23 PROCEDURE — 6370000000 HC RX 637 (ALT 250 FOR IP): Performed by: SPECIALIST

## 2019-09-23 PROCEDURE — 97110 THERAPEUTIC EXERCISES: CPT

## 2019-09-23 PROCEDURE — 2580000003 HC RX 258: Performed by: INTERNAL MEDICINE

## 2019-09-23 PROCEDURE — 6360000002 HC RX W HCPCS: Performed by: INTERNAL MEDICINE

## 2019-09-23 PROCEDURE — 6370000000 HC RX 637 (ALT 250 FOR IP): Performed by: INTERNAL MEDICINE

## 2019-09-23 PROCEDURE — 97530 THERAPEUTIC ACTIVITIES: CPT

## 2019-09-23 PROCEDURE — 1200000000 HC SEMI PRIVATE

## 2019-09-23 RX ORDER — PETROLATUM 42 G/100G
OINTMENT TOPICAL
Qty: 1 TUBE | Refills: 0 | Status: SHIPPED | OUTPATIENT
Start: 2019-09-23

## 2019-09-23 RX ORDER — LEVOFLOXACIN 500 MG/1
500 TABLET, FILM COATED ORAL EVERY OTHER DAY
Qty: 5 TABLET | Refills: 0 | Status: SHIPPED | OUTPATIENT
Start: 2019-09-24 | End: 2019-10-04

## 2019-09-23 RX ORDER — ZINC OXIDE
OINTMENT (GRAM) TOPICAL
Qty: 1 TUBE | Refills: 0 | Status: SHIPPED | OUTPATIENT
Start: 2019-09-23

## 2019-09-23 RX ORDER — DOXYCYCLINE HYCLATE 100 MG/1
100 CAPSULE ORAL EVERY 12 HOURS SCHEDULED
Status: DISCONTINUED | OUTPATIENT
Start: 2019-09-23 | End: 2019-09-24 | Stop reason: HOSPADM

## 2019-09-23 RX ORDER — METOPROLOL SUCCINATE 25 MG/1
25 TABLET, EXTENDED RELEASE ORAL DAILY
Qty: 30 TABLET | Refills: 3 | Status: SHIPPED | OUTPATIENT
Start: 2019-09-24

## 2019-09-23 RX ORDER — DOXYCYCLINE HYCLATE 100 MG/1
100 CAPSULE ORAL EVERY 12 HOURS SCHEDULED
Qty: 120 CAPSULE | Refills: 0 | DISCHARGE
Start: 2019-09-23 | End: 2019-11-22

## 2019-09-23 RX ADMIN — METOPROLOL SUCCINATE 25 MG: 25 TABLET, EXTENDED RELEASE ORAL at 08:11

## 2019-09-23 RX ADMIN — Medication 10 ML: at 08:12

## 2019-09-23 RX ADMIN — Medication 300 UNITS: at 08:11

## 2019-09-23 RX ADMIN — APIXABAN 2.5 MG: 2.5 TABLET, FILM COATED ORAL at 08:11

## 2019-09-23 RX ADMIN — DOXYCYCLINE HYCLATE 100 MG: 100 CAPSULE ORAL at 22:17

## 2019-09-23 RX ADMIN — FUROSEMIDE 40 MG: 40 TABLET ORAL at 08:11

## 2019-09-23 RX ADMIN — Medication 10 ML: at 21:00

## 2019-09-23 RX ADMIN — Medication 300 UNITS: at 21:00

## 2019-09-23 RX ADMIN — APIXABAN 2.5 MG: 2.5 TABLET, FILM COATED ORAL at 21:00

## 2019-09-23 RX ADMIN — MULTIPLE VITAMINS W/ MINERALS TAB 1 TABLET: TAB at 08:11

## 2019-09-23 ASSESSMENT — PAIN SCALES - GENERAL: PAINLEVEL_OUTOF10: 0

## 2019-10-22 DIAGNOSIS — S72.002A CLOSED DISPLACED FRACTURE OF LEFT FEMORAL NECK (HCC): Primary | ICD-10-CM

## 2019-11-11 PROBLEM — T81.49XA SURGICAL WOUND INFECTION: Status: ACTIVE | Noted: 2019-11-11

## 2022-07-30 NOTE — BRIEF OP NOTE
Brief Postoperative Note  ______________________________________________________________    Patient: Marcelle Becker  YOB: 1928  MRN: [id number]  Date of Procedure: 7/3/2019    Pre-Op Diagnosis: Right Hip Fracture    Post-Op Diagnosis: Same       Procedure(s):  HIP OPEN REDUCTION INTERNAL FIXATION    Anesthesia: General    Surgeon(s):  Gretta Horn MD    Assistant: Amy Addison PGY 5, Rajesh Nelson PGY 1    Estimated Blood Loss (mL): less than 50     Complications: None    Specimens:   * No specimens in log *    Implants:  Implant Name Type Inv.  Item Serial No.  Lot No. LRB No. Used   PLATE FEM NECK SYS 2HL ST Screw/Plate/Nail/Sergio PLATE FEM NECK SYS 2HL ST  [employer] [id number] Right 1   IMPL BOLT FEM NECK SYSTEM 90MM ST Screw/Plate/Nail/Sergio IMPL BOLT FEM NECK SYSTEM 90MM ST  [employer] [id number] Right 1   SCREW ANTIROTATION FEM NECK SYS 90MM Screw/Plate/Nail/Sergio SCREW ANTIROTATION FEM NECK SYS 90MM  [employer] [id number] Right 1   SCREW LK SLFTP W/ T25 5X40MM ST Screw/Plate/Nail/Sergio SCREW LK SLFTP W/ T25 5X40MM ST  [employer] [id number] Right 1   SCREW LK SLFTP W/ T25 5X40MM ST Screw/Plate/Nail/Sergio SCREW LK SLFTP W/ T25 5X40MM ST  [employer] [id number] Right 1         Drains:   Urethral Catheter Straight-tip (Active)   Output (mL) 200 mL 7/3/2019  5:50 AM       Findings: See Op Note    Morelia Chowdhury DO  Date: 7/3/2019  Time: 9:46 AM
[employer]

## (undated) DEVICE — E-Z CLEAN, NON-STICK, PTFE COATED, ELECTROSURGICAL BLADE ELECTRODE, 6.5 INCH (16.5 CM): Brand: MEGADYNE

## (undated) DEVICE — GLOVE ORANGE PI 8   MSG9080

## (undated) DEVICE — SOLUTION IV IRRIG POUR BRL 0.9% SODIUM CHL 2F7124

## (undated) DEVICE — GLOVE SURG SZ 8 L12IN FNGR THK79MIL GRN LTX FREE

## (undated) DEVICE — 3M™ STERI-DRAPE™ U-DRAPE 1015: Brand: STERI-DRAPE™

## (undated) DEVICE — DRAPE C ARM W41XL74IN UNIV MOB W RUBBERBAND CLP

## (undated) DEVICE — APPLICATOR PREP 26ML 0.7% IOD POVACRYLEX 74% ISO ALC ST

## (undated) DEVICE — CLOTH SURG PREP PREOPERATIVE CHLORHEXIDINE GLUC 2% READYPREP

## (undated) DEVICE — GUIDEWIRE ORTH L400MM DIA3.2MM FOR TFN

## (undated) DEVICE — SURGICAL PROCEDURE PACK BASIC

## (undated) DEVICE — PATIENT RETURN ELECTRODE, SINGLE-USE, CONTACT QUALITY MONITORING, ADULT, WITH 9FT CORD, FOR PATIENTS WEIGING OVER 33LBS. (15KG): Brand: MEGADYNE

## (undated) DEVICE — SET ORTHO STD STORTSTD1

## (undated) DEVICE — INTENDED FOR TISSUE SEPARATION, AND OTHER PROCEDURES THAT REQUIRE A SHARP SURGICAL BLADE TO PUNCTURE OR CUT.: Brand: BARD-PARKER ® STAINLESS STEEL BLADES

## (undated) DEVICE — BIT DRL L413MM DIA4.3MM FOR FEM NK SYSTEN

## (undated) DEVICE — DRILL SYSTEM 7

## (undated) DEVICE — TOWEL,OR,DSP,ST,BLUE,DLX,10/PK,8PK/CS: Brand: MEDLINE

## (undated) DEVICE — DRIP REDUCTION MANIFOLD

## (undated) DEVICE — DRESSING FOAM W22XL25CM FILVE LAYR FOAM DP DEF SAFETAC

## (undated) DEVICE — COVER,TABLE,60X90,STERILE: Brand: MEDLINE

## (undated) DEVICE — EXTRAS HIP

## (undated) DEVICE — DRAPE PATIENT ISOL IRRIG POUCH

## (undated) DEVICE — SET ORTHO STD STORTSTD2

## (undated) DEVICE — SYRINGE 20ML LL S/C 50

## (undated) DEVICE — Device

## (undated) DEVICE — 3M™ COBAN™ NL STERILE NON-LATEX SELF-ADHERENT WRAP, 2084S, 4 IN X 5 YD (10 CM X 4,5 M), 18 ROLLS/CASE: Brand: 3M™ COBAN™

## (undated) DEVICE — SHEET,DRAPE,53X77,STERILE: Brand: MEDLINE

## (undated) DEVICE — HANDPIECE SET WITH BONE CLEANING TIP AND SUCTION TUBE: Brand: INTERPULSE

## (undated) DEVICE — SOLUTION IV IRRIG WATER 1000ML POUR BRL 2F7114

## (undated) DEVICE — STRYKER PERFORMANCE SERIES SAGITTAL BLADE: Brand: STRYKER PERFORMANCE SERIES

## (undated) DEVICE — SET ORTHO ACCOLADE HEMI HIP INSRT

## (undated) DEVICE — SET ORTHO ACCOLADE HEMI HIP BROACH

## (undated) DEVICE — TOTAL HIP PK

## (undated) DEVICE — 1000 S-DRAPE TOWEL DRAPE 10/BX: Brand: STERI-DRAPE™

## (undated) DEVICE — SET LAMBOTTE

## (undated) DEVICE — Z DISCONTINUED USE 2275686 GLOVE SURG SZ 8 L12IN FNGR THK13MIL WHT ISOLEX POLYISOPRENE

## (undated) DEVICE — PADDING CAST W6INXL4YD COT LO LINTING WYTEX

## (undated) DEVICE — GOWN,BREATHABLE SLV,AURORA,XLG,STRL: Brand: MEDLINE

## (undated) DEVICE — GOWN,AURORA,BRTHSLV,2XL,18/CS: Brand: MEDLINE

## (undated) DEVICE — RACK TUBE CURETTE

## (undated) DEVICE — STANDARD HYPODERMIC NEEDLE,POLYPROPYLENE HUB: Brand: MONOJECT

## (undated) DEVICE — Z INACTIVE USE 2660664 SOLUTION IRRIG 3000ML 0.9% SOD CHL USP UROMATIC PLAS CONT

## (undated) DEVICE — SYRINGE MED 50ML LUERLOCK TIP

## (undated) DEVICE — HEWSON SUTURE RETRIEVER: Brand: HEWSON SUTURE RETRIEVER

## (undated) DEVICE — PICO 7 10CM X 30CM: Brand: PICO™ 7

## (undated) DEVICE — SET ORTHO CENTRAX CUPS